# Patient Record
Sex: MALE | Race: OTHER | HISPANIC OR LATINO
[De-identification: names, ages, dates, MRNs, and addresses within clinical notes are randomized per-mention and may not be internally consistent; named-entity substitution may affect disease eponyms.]

---

## 2018-06-13 ENCOUNTER — TRANSCRIPTION ENCOUNTER (OUTPATIENT)
Age: 73
End: 2018-06-13

## 2018-06-14 ENCOUNTER — OUTPATIENT (OUTPATIENT)
Dept: OUTPATIENT SERVICES | Facility: HOSPITAL | Age: 73
LOS: 1 days | End: 2018-06-14
Payer: COMMERCIAL

## 2018-06-14 DIAGNOSIS — Z86.010 PERSONAL HISTORY OF COLONIC POLYPS: ICD-10-CM

## 2018-06-14 PROCEDURE — 45378 DIAGNOSTIC COLONOSCOPY: CPT

## 2018-11-19 PROBLEM — Z86.79 HISTORY OF HYPERTENSION: Status: RESOLVED | Noted: 2018-11-19 | Resolved: 2018-11-19

## 2018-11-19 PROBLEM — Z81.8 FAMILY HISTORY OF DEPRESSION: Status: ACTIVE | Noted: 2018-11-19

## 2018-11-19 PROBLEM — Z80.0 FAMILY HISTORY OF MALIGNANT NEOPLASM OF COLON: Status: ACTIVE | Noted: 2018-11-19

## 2018-11-19 PROBLEM — Z78.9 SOCIAL ALCOHOL USE: Status: ACTIVE | Noted: 2018-11-19

## 2018-11-19 PROBLEM — K64.8 HEMORRHOIDS, INTERNAL: Status: RESOLVED | Noted: 2018-11-19 | Resolved: 2018-11-19

## 2018-11-19 PROBLEM — Z00.00 ENCOUNTER FOR PREVENTIVE HEALTH EXAMINATION: Status: ACTIVE | Noted: 2018-11-19

## 2018-11-19 PROBLEM — I35.0 MILD AORTIC STENOSIS: Status: RESOLVED | Noted: 2018-11-19 | Resolved: 2018-11-19

## 2018-11-19 PROBLEM — Z87.891 FORMER SMOKER: Status: ACTIVE | Noted: 2018-11-19

## 2018-11-20 ENCOUNTER — APPOINTMENT (OUTPATIENT)
Dept: CARDIOTHORACIC SURGERY | Facility: CLINIC | Age: 73
End: 2018-11-20
Payer: MEDICARE

## 2018-11-20 VITALS
HEIGHT: 68 IN | BODY MASS INDEX: 35.46 KG/M2 | WEIGHT: 234 LBS | SYSTOLIC BLOOD PRESSURE: 167 MMHG | DIASTOLIC BLOOD PRESSURE: 107 MMHG | OXYGEN SATURATION: 98 % | HEART RATE: 65 BPM | RESPIRATION RATE: 14 BRPM | TEMPERATURE: 98.3 F

## 2018-11-20 VITALS — DIASTOLIC BLOOD PRESSURE: 85 MMHG | SYSTOLIC BLOOD PRESSURE: 141 MMHG

## 2018-11-20 DIAGNOSIS — Z87.891 PERSONAL HISTORY OF NICOTINE DEPENDENCE: ICD-10-CM

## 2018-11-20 DIAGNOSIS — Z80.0 FAMILY HISTORY OF MALIGNANT NEOPLASM OF DIGESTIVE ORGANS: ICD-10-CM

## 2018-11-20 DIAGNOSIS — I35.0 NONRHEUMATIC AORTIC (VALVE) STENOSIS: ICD-10-CM

## 2018-11-20 DIAGNOSIS — Z86.79 PERSONAL HISTORY OF OTHER DISEASES OF THE CIRCULATORY SYSTEM: ICD-10-CM

## 2018-11-20 DIAGNOSIS — I71.2 THORACIC AORTIC ANEURYSM, W/OUT RUPTURE: ICD-10-CM

## 2018-11-20 DIAGNOSIS — K64.8 OTHER HEMORRHOIDS: ICD-10-CM

## 2018-11-20 DIAGNOSIS — Z81.8 FAMILY HISTORY OF OTHER MENTAL AND BEHAVIORAL DISORDERS: ICD-10-CM

## 2018-11-20 DIAGNOSIS — Z78.9 OTHER SPECIFIED HEALTH STATUS: ICD-10-CM

## 2018-11-20 PROCEDURE — 99204 OFFICE O/P NEW MOD 45 MIN: CPT

## 2018-11-20 RX ORDER — COLD-HOT PACK
125 MCG EACH MISCELLANEOUS DAILY
Refills: 0 | Status: ACTIVE | COMMUNITY

## 2018-11-20 RX ORDER — ASPIRIN 325 MG/1
325 TABLET, COATED ORAL
Refills: 6 | Status: COMPLETED | COMMUNITY
End: 2018-11-20

## 2018-11-20 RX ORDER — HYDROCORTISONE ACETATE 0.5 %
CREAM (GRAM) TOPICAL DAILY
Refills: 0 | Status: ACTIVE | COMMUNITY
Start: 2018-11-20

## 2018-12-03 ENCOUNTER — OUTPATIENT (OUTPATIENT)
Dept: OUTPATIENT SERVICES | Facility: HOSPITAL | Age: 73
LOS: 1 days | End: 2018-12-03
Payer: COMMERCIAL

## 2018-12-03 VITALS
RESPIRATION RATE: 16 BRPM | DIASTOLIC BLOOD PRESSURE: 91 MMHG | HEIGHT: 68 IN | OXYGEN SATURATION: 96 % | HEART RATE: 68 BPM | SYSTOLIC BLOOD PRESSURE: 180 MMHG | TEMPERATURE: 98 F | WEIGHT: 233.91 LBS

## 2018-12-03 DIAGNOSIS — I71.8 AORTIC ANEURYSM OF UNSPECIFIED SITE, RUPTURED: ICD-10-CM

## 2018-12-03 DIAGNOSIS — Z98.49 CATARACT EXTRACTION STATUS, UNSPECIFIED EYE: Chronic | ICD-10-CM

## 2018-12-03 DIAGNOSIS — Z98.890 OTHER SPECIFIED POSTPROCEDURAL STATES: Chronic | ICD-10-CM

## 2018-12-03 LAB
ALBUMIN SERPL ELPH-MCNC: 4.3 G/DL — SIGNIFICANT CHANGE UP (ref 3.3–5)
ALP SERPL-CCNC: 66 U/L — SIGNIFICANT CHANGE UP (ref 40–120)
ALT FLD-CCNC: 15 U/L — SIGNIFICANT CHANGE UP (ref 10–45)
ANION GAP SERPL CALC-SCNC: 13 MMOL/L — SIGNIFICANT CHANGE UP (ref 5–17)
AST SERPL-CCNC: 23 U/L — SIGNIFICANT CHANGE UP (ref 10–40)
BILIRUB SERPL-MCNC: 0.4 MG/DL — SIGNIFICANT CHANGE UP (ref 0.2–1.2)
BUN SERPL-MCNC: 21 MG/DL — SIGNIFICANT CHANGE UP (ref 7–23)
CALCIUM SERPL-MCNC: 9.2 MG/DL — SIGNIFICANT CHANGE UP (ref 8.4–10.5)
CHLORIDE SERPL-SCNC: 106 MMOL/L — SIGNIFICANT CHANGE UP (ref 96–108)
CO2 SERPL-SCNC: 22 MMOL/L — SIGNIFICANT CHANGE UP (ref 22–31)
CREAT SERPL-MCNC: 0.88 MG/DL — SIGNIFICANT CHANGE UP (ref 0.5–1.3)
GLUCOSE SERPL-MCNC: 104 MG/DL — HIGH (ref 70–99)
HCT VFR BLD CALC: 40.9 % — SIGNIFICANT CHANGE UP (ref 39–50)
HGB BLD-MCNC: 13.6 G/DL — SIGNIFICANT CHANGE UP (ref 13–17)
MCHC RBC-ENTMCNC: 30.8 PG — SIGNIFICANT CHANGE UP (ref 27–34)
MCHC RBC-ENTMCNC: 33.4 GM/DL — SIGNIFICANT CHANGE UP (ref 32–36)
MCV RBC AUTO: 92.3 FL — SIGNIFICANT CHANGE UP (ref 80–100)
PLATELET # BLD AUTO: 196 K/UL — SIGNIFICANT CHANGE UP (ref 150–400)
POTASSIUM SERPL-MCNC: 4.8 MMOL/L — SIGNIFICANT CHANGE UP (ref 3.5–5.3)
POTASSIUM SERPL-SCNC: 4.8 MMOL/L — SIGNIFICANT CHANGE UP (ref 3.5–5.3)
PROT SERPL-MCNC: 7.2 G/DL — SIGNIFICANT CHANGE UP (ref 6–8.3)
RBC # BLD: 4.43 M/UL — SIGNIFICANT CHANGE UP (ref 4.2–5.8)
RBC # FLD: 12 % — SIGNIFICANT CHANGE UP (ref 10.3–14.5)
SODIUM SERPL-SCNC: 141 MMOL/L — SIGNIFICANT CHANGE UP (ref 135–145)
WBC # BLD: 5.6 K/UL — SIGNIFICANT CHANGE UP (ref 3.8–10.5)
WBC # FLD AUTO: 5.6 K/UL — SIGNIFICANT CHANGE UP (ref 3.8–10.5)

## 2018-12-03 PROCEDURE — 80053 COMPREHEN METABOLIC PANEL: CPT

## 2018-12-03 PROCEDURE — 99152 MOD SED SAME PHYS/QHP 5/>YRS: CPT

## 2018-12-03 PROCEDURE — 85027 COMPLETE CBC AUTOMATED: CPT

## 2018-12-03 PROCEDURE — C1769: CPT

## 2018-12-03 PROCEDURE — C1894: CPT

## 2018-12-03 PROCEDURE — C1887: CPT

## 2018-12-03 PROCEDURE — 93005 ELECTROCARDIOGRAM TRACING: CPT

## 2018-12-03 PROCEDURE — 99203 OFFICE O/P NEW LOW 30 MIN: CPT

## 2018-12-03 PROCEDURE — 93010 ELECTROCARDIOGRAM REPORT: CPT

## 2018-12-03 PROCEDURE — 93454 CORONARY ARTERY ANGIO S&I: CPT | Mod: 26,GC

## 2018-12-03 PROCEDURE — 93454 CORONARY ARTERY ANGIO S&I: CPT

## 2018-12-03 NOTE — H&P CARDIOLOGY - HISTORY OF PRESENT ILLNESS
73 year old  male h/o HTN, ascending aortic aneurysm (5.3 cm) who c/o PISANO x 1 year. Pt is here today for [facility] prior to aneurysm repair surgery on Dec 13th.

## 2018-12-05 ENCOUNTER — OUTPATIENT (OUTPATIENT)
Dept: OUTPATIENT SERVICES | Facility: HOSPITAL | Age: 73
LOS: 1 days | End: 2018-12-05
Payer: COMMERCIAL

## 2018-12-05 ENCOUNTER — APPOINTMENT (OUTPATIENT)
Dept: ULTRASOUND IMAGING | Facility: HOSPITAL | Age: 73
End: 2018-12-05

## 2018-12-05 ENCOUNTER — APPOINTMENT (OUTPATIENT)
Dept: CARDIOTHORACIC SURGERY | Facility: CLINIC | Age: 73
End: 2018-12-05
Payer: MEDICARE

## 2018-12-05 VITALS
HEIGHT: 68 IN | OXYGEN SATURATION: 96 % | HEART RATE: 84 BPM | RESPIRATION RATE: 16 BRPM | DIASTOLIC BLOOD PRESSURE: 80 MMHG | WEIGHT: 233.91 LBS | SYSTOLIC BLOOD PRESSURE: 160 MMHG | TEMPERATURE: 99 F

## 2018-12-05 DIAGNOSIS — I71.1 THORACIC AORTIC ANEURYSM, RUPTURED: ICD-10-CM

## 2018-12-05 DIAGNOSIS — Z29.9 ENCOUNTER FOR PROPHYLACTIC MEASURES, UNSPECIFIED: ICD-10-CM

## 2018-12-05 DIAGNOSIS — Z98.49 CATARACT EXTRACTION STATUS, UNSPECIFIED EYE: Chronic | ICD-10-CM

## 2018-12-05 DIAGNOSIS — G47.33 OBSTRUCTIVE SLEEP APNEA (ADULT) (PEDIATRIC): ICD-10-CM

## 2018-12-05 DIAGNOSIS — I35.0 NONRHEUMATIC AORTIC (VALVE) STENOSIS: ICD-10-CM

## 2018-12-05 DIAGNOSIS — Z01.818 ENCOUNTER FOR OTHER PREPROCEDURAL EXAMINATION: ICD-10-CM

## 2018-12-05 DIAGNOSIS — Z98.890 OTHER SPECIFIED POSTPROCEDURAL STATES: Chronic | ICD-10-CM

## 2018-12-05 DIAGNOSIS — I10 ESSENTIAL (PRIMARY) HYPERTENSION: ICD-10-CM

## 2018-12-05 DIAGNOSIS — I71.2 THORACIC AORTIC ANEURYSM, WITHOUT RUPTURE: ICD-10-CM

## 2018-12-05 LAB
ANION GAP SERPL CALC-SCNC: 11 MMOL/L — SIGNIFICANT CHANGE UP (ref 5–17)
BLD GP AB SCN SERPL QL: NEGATIVE — SIGNIFICANT CHANGE UP
BUN SERPL-MCNC: 13 MG/DL — SIGNIFICANT CHANGE UP (ref 7–23)
CALCIUM SERPL-MCNC: 9.2 MG/DL — SIGNIFICANT CHANGE UP (ref 8.4–10.5)
CHLORIDE SERPL-SCNC: 107 MMOL/L — SIGNIFICANT CHANGE UP (ref 96–108)
CO2 SERPL-SCNC: 25 MMOL/L — SIGNIFICANT CHANGE UP (ref 22–31)
CREAT SERPL-MCNC: 0.91 MG/DL — SIGNIFICANT CHANGE UP (ref 0.5–1.3)
GLUCOSE SERPL-MCNC: 125 MG/DL — HIGH (ref 70–99)
HBA1C BLD-MCNC: 5.5 % — SIGNIFICANT CHANGE UP (ref 4–5.6)
HCT VFR BLD CALC: 39.1 % — SIGNIFICANT CHANGE UP (ref 39–50)
HGB BLD-MCNC: 13.2 G/DL — SIGNIFICANT CHANGE UP (ref 13–17)
MCHC RBC-ENTMCNC: 31.2 PG — SIGNIFICANT CHANGE UP (ref 27–34)
MCHC RBC-ENTMCNC: 33.8 GM/DL — SIGNIFICANT CHANGE UP (ref 32–36)
MCV RBC AUTO: 92.4 FL — SIGNIFICANT CHANGE UP (ref 80–100)
MRSA PCR RESULT.: SIGNIFICANT CHANGE UP
PLATELET # BLD AUTO: 204 K/UL — SIGNIFICANT CHANGE UP (ref 150–400)
POTASSIUM SERPL-MCNC: 4 MMOL/L — SIGNIFICANT CHANGE UP (ref 3.5–5.3)
POTASSIUM SERPL-SCNC: 4 MMOL/L — SIGNIFICANT CHANGE UP (ref 3.5–5.3)
RBC # BLD: 4.23 M/UL — SIGNIFICANT CHANGE UP (ref 4.2–5.8)
RBC # FLD: 13.3 % — SIGNIFICANT CHANGE UP (ref 10.3–14.5)
RH IG SCN BLD-IMP: POSITIVE — SIGNIFICANT CHANGE UP
S AUREUS DNA NOSE QL NAA+PROBE: SIGNIFICANT CHANGE UP
SODIUM SERPL-SCNC: 143 MMOL/L — SIGNIFICANT CHANGE UP (ref 135–145)
WBC # BLD: 5.4 K/UL — SIGNIFICANT CHANGE UP (ref 3.8–10.5)
WBC # FLD AUTO: 5.4 K/UL — SIGNIFICANT CHANGE UP (ref 3.8–10.5)

## 2018-12-05 PROCEDURE — 93880 EXTRACRANIAL BILAT STUDY: CPT | Mod: 26

## 2018-12-05 PROCEDURE — 85027 COMPLETE CBC AUTOMATED: CPT

## 2018-12-05 PROCEDURE — 83036 HEMOGLOBIN GLYCOSYLATED A1C: CPT

## 2018-12-05 PROCEDURE — 71046 X-RAY EXAM CHEST 2 VIEWS: CPT | Mod: 26

## 2018-12-05 PROCEDURE — 86901 BLOOD TYPING SEROLOGIC RH(D): CPT

## 2018-12-05 PROCEDURE — 86850 RBC ANTIBODY SCREEN: CPT

## 2018-12-05 PROCEDURE — 86900 BLOOD TYPING SEROLOGIC ABO: CPT

## 2018-12-05 PROCEDURE — G0463: CPT

## 2018-12-05 PROCEDURE — 71046 X-RAY EXAM CHEST 2 VIEWS: CPT

## 2018-12-05 PROCEDURE — 94010 BREATHING CAPACITY TEST: CPT

## 2018-12-05 PROCEDURE — 87641 MR-STAPH DNA AMP PROBE: CPT

## 2018-12-05 PROCEDURE — 80048 BASIC METABOLIC PNL TOTAL CA: CPT

## 2018-12-05 PROCEDURE — 87640 STAPH A DNA AMP PROBE: CPT

## 2018-12-05 PROCEDURE — 93880 EXTRACRANIAL BILAT STUDY: CPT

## 2018-12-05 RX ORDER — SODIUM CHLORIDE 9 MG/ML
3 INJECTION INTRAMUSCULAR; INTRAVENOUS; SUBCUTANEOUS EVERY 8 HOURS
Qty: 0 | Refills: 0 | Status: DISCONTINUED | OUTPATIENT
Start: 2018-12-13 | End: 2018-12-13

## 2018-12-05 RX ORDER — LIDOCAINE HCL 20 MG/ML
0.2 VIAL (ML) INJECTION ONCE
Qty: 0 | Refills: 0 | Status: DISCONTINUED | OUTPATIENT
Start: 2018-12-13 | End: 2018-12-13

## 2018-12-05 RX ORDER — CEFUROXIME AXETIL 250 MG
1500 TABLET ORAL ONCE
Qty: 0 | Refills: 0 | Status: COMPLETED | OUTPATIENT
Start: 2018-12-13 | End: 2018-12-13

## 2018-12-05 NOTE — H&P PST ADULT - PROBLEM SELECTOR PLAN 4
no The Caprini score indicates that this patient is at high risk for a VTE event (score = 6).    Ssurgical patients in this group will benefit from both pharmacologic prophylaxis and intermittent compression devices.    The surgical team will determine the balance between VTE risk and bleeding risk, and other clinical considerations.

## 2018-12-05 NOTE — H&P PST ADULT - NEGATIVE CARDIOVASCULAR SYMPTOMS
no orthopnea/no paroxysmal nocturnal dyspnea/no claudication/no chest pain/no peripheral edema/no palpitations

## 2018-12-05 NOTE — H&P PST ADULT - VISION (WITH CORRECTIVE LENSES IF THE PATIENT USUALLY WEARS THEM):
Do Not Resuscitate (DNR)/Health Care Proxy (HCP) Normal vision: sees adequately in most situations; can see medication labels, newsprint

## 2018-12-05 NOTE — H&P PST ADULT - HISTORY OF PRESENT ILLNESS
74 yo  73 year old male with a past medical history of hypertension , Hemorrhoids presents for an initial evaluation and management of an ascending aortic aneurysm. He complaints of shortness of breath with exertion for 1 year. Denies any chest pain, palpitations, dizziness, hemoptysis, pedal edema, PND .     Echocardiogram on 11/15/2018 revealed   -trace mitral regurgitation   -ascending and transverse aorta are dilated measuring up to 5.25 cm.    CTA chest on 11/16/2018 revealed   -ascending thoracic aorta measures 5.3 cm  -aortic arch: 2.8 cm  -descending aorta measures 3.1 cm.   -No evidence of dissection 74 yo male, PMH HTN, with known AAA for about 2 years, reports PISANO that he first noticed last winter when shoveling snow and has become worse, recent echocardiogram on 11/15/18 revealed an increase in size on AAA to 5.25 and calcified aortic valve. Pt. presents to PST today for scheduled Aortic Valve Replacement on 12/13/18. Pt. denies recent fever, chills, chest pain, palpitations, changes in bowel/urinary habits. 74 yo male, PMH HTN, with known AAA for about 2 years, reports PISANO that he first noticed last winter when shoveling snow and has become worse, recent echocardiogram on 11/15/18 revealed an increase in size on AAA to 5.25 and calcified aortic valve. Pt. presents to PST today for scheduled Hemiarch aortic Valve Replacement/right Radial A-line on 12/13/18. Pt. denies recent fever, chills, chest pain, palpitations, changes in bowel/urinary habits.

## 2018-12-05 NOTE — H&P PST ADULT - ASSESSMENT
CAPRINI SCORE [CLOT]    AGE RELATED RISK FACTORS                                                       MOBILITY RELATED FACTORS  [ ] Age 41-60 years                                            (1 Point)                  [ ] Bed rest                                                        (1 Point)  [ ] Age: 61-74 years                                           (2 Points)                 [ ] Plaster cast                                                   (2 Points)  [x ] Age= 75 years                                              (3 Points)                 [ ] Bed bound for more than 72 hours                 (2 Points)    DISEASE RELATED RISK FACTORS                                               GENDER SPECIFIC FACTORS  [ ] Edema in the lower extremities                       (1 Point)                  [ ] Pregnancy                                                     (1 Point)  [ ] Varicose veins                                               (1 Point)                  [ ] Post-partum < 6 weeks                                   (1 Point)             [x ] BMI > 25 Kg/m2                                            (1 Point)                  [ ] Hormonal therapy  or oral contraception          (1 Point)                 [ ] Sepsis (in the previous month)                        (1 Point)                  [ ] History of pregnancy complications                 (1 point)  [ ] Pneumonia or serious lung disease                                               [ ] Unexplained or recurrent                     (1 Point)           (in the previous month)                               (1 Point)  [ ] Abnormal pulmonary function test                     (1 Point)                 SURGERY RELATED RISK FACTORS  [ ] Acute myocardial infarction                              (1 Point)                 [ ]  Section                                             (1 Point)  [ ] Congestive heart failure (in the previous month)  (1 Point)               [ ] Minor surgery                                                  (1 Point)   [ ] Inflammatory bowel disease                             (1 Point)                 [ ] Arthroscopic surgery                                        (2 Points)  [ ] Central venous access                                      (2 Points)                [x ] General surgery lasting more than 45 minutes   (2 Points)       [ ] Stroke (in the previous month)                          (5 Points)               [ ] Elective arthroplasty                                         (5 Points)                                                                                                                                               HEMATOLOGY RELATED FACTORS                                                 TRAUMA RELATED RISK FACTORS  [ ] Prior episodes of VTE                                     (3 Points)                 [ ] Fracture of the hip, pelvis, or leg                       (5 Points)  [ ] Positive family history for VTE                         (3 Points)                 [ ] Acute spinal cord injury (in the previous month)  (5 Points)  [ ] Prothrombin 01091 A                                     (3 Points)                 [ ] Paralysis  (less than 1 month)                             (5 Points)  [ ] Factor V Leiden                                             (3 Points)                  [ ] Multiple Trauma within 1 month                        (5 Points)  [ ] Lupus anticoagulants                                     (3 Points)                                                           [ ] Anticardiolipin antibodies                               (3 Points)                                                       [ ] High homocysteine in the blood                      (3 Points)                                             [ ] Other congenital or acquired thrombophilia      (3 Points)                                                [ ] Heparin induced thrombocytopenia                  (3 Points)                                          Total Score [   6       ]

## 2018-12-05 NOTE — H&P PST ADULT - NSANTHOSAYNRD_GEN_A_CORE
No. KRISTINE screening performed.  STOP BANG Legend: 0-2 = LOW Risk; 3-4 = INTERMEDIATE Risk; 5-8 = HIGH Risk

## 2018-12-05 NOTE — H&P PST ADULT - NEGATIVE ENMT SYMPTOMS
no nasal congestion/no nasal discharge/no vertigo/no tinnitus no nasal congestion/no nasal discharge/no vertigo/no sinus symptoms/no ear pain/no tinnitus

## 2018-12-05 NOTE — H&P PST ADULT - PMH
Aortic valve disease    Ascending aortic aneurysm    HTN (hypertension)    OA (osteoarthritis) of knee  left

## 2018-12-05 NOTE — H&P PST ADULT - PROBLEM SELECTOR PLAN 1
aortic Valve Hemiarch aortic Valve Replacement/right Radial A-line   Pre-op instructions, including Chlorhexidine soap, provided - all questions answered

## 2018-12-06 PROBLEM — R58 HEMORRHAGE, NOT ELSEWHERE CLASSIFIED: Chronic | Status: INACTIVE | Noted: 2018-12-03 | Resolved: 2018-12-05

## 2018-12-12 ENCOUNTER — TRANSCRIPTION ENCOUNTER (OUTPATIENT)
Age: 73
End: 2018-12-12

## 2018-12-12 PROBLEM — I71.2 THORACIC AORTIC ANEURYSM, WITHOUT RUPTURE: Chronic | Status: ACTIVE | Noted: 2018-12-03

## 2018-12-12 PROBLEM — I10 ESSENTIAL (PRIMARY) HYPERTENSION: Chronic | Status: ACTIVE | Noted: 2018-12-03

## 2018-12-12 PROBLEM — K64.9 UNSPECIFIED HEMORRHOIDS: Chronic | Status: ACTIVE | Noted: 2018-12-05

## 2018-12-12 PROBLEM — I35.9 NONRHEUMATIC AORTIC VALVE DISORDER, UNSPECIFIED: Chronic | Status: ACTIVE | Noted: 2018-12-05

## 2018-12-12 PROBLEM — M17.10 UNILATERAL PRIMARY OSTEOARTHRITIS, UNSPECIFIED KNEE: Chronic | Status: ACTIVE | Noted: 2018-12-05

## 2018-12-13 ENCOUNTER — INPATIENT (INPATIENT)
Facility: HOSPITAL | Age: 73
LOS: 6 days | Discharge: HOME | DRG: 219 | End: 2018-12-20
Attending: THORACIC SURGERY (CARDIOTHORACIC VASCULAR SURGERY) | Admitting: THORACIC SURGERY (CARDIOTHORACIC VASCULAR SURGERY)
Payer: COMMERCIAL

## 2018-12-13 ENCOUNTER — APPOINTMENT (OUTPATIENT)
Dept: CARDIOTHORACIC SURGERY | Facility: HOSPITAL | Age: 73
End: 2018-12-13
Payer: MEDICARE

## 2018-12-13 ENCOUNTER — RESULT REVIEW (OUTPATIENT)
Age: 73
End: 2018-12-13

## 2018-12-13 VITALS
RESPIRATION RATE: 18 BRPM | HEIGHT: 68 IN | DIASTOLIC BLOOD PRESSURE: 84 MMHG | WEIGHT: 237.66 LBS | TEMPERATURE: 98 F | HEART RATE: 70 BPM | OXYGEN SATURATION: 98 % | SYSTOLIC BLOOD PRESSURE: 146 MMHG

## 2018-12-13 DIAGNOSIS — I71.1 THORACIC AORTIC ANEURYSM, RUPTURED: ICD-10-CM

## 2018-12-13 DIAGNOSIS — Z98.49 CATARACT EXTRACTION STATUS, UNSPECIFIED EYE: Chronic | ICD-10-CM

## 2018-12-13 DIAGNOSIS — I35.0 NONRHEUMATIC AORTIC (VALVE) STENOSIS: ICD-10-CM

## 2018-12-13 DIAGNOSIS — Z98.890 OTHER SPECIFIED POSTPROCEDURAL STATES: Chronic | ICD-10-CM

## 2018-12-13 LAB
ALBUMIN SERPL ELPH-MCNC: 3.4 G/DL — SIGNIFICANT CHANGE UP (ref 3.3–5)
ALP SERPL-CCNC: 38 U/L — LOW (ref 40–120)
ALT FLD-CCNC: 13 U/L — SIGNIFICANT CHANGE UP (ref 10–45)
ANION GAP SERPL CALC-SCNC: 13 MMOL/L — SIGNIFICANT CHANGE UP (ref 5–17)
APTT BLD: 31.1 SEC — SIGNIFICANT CHANGE UP (ref 27.5–36.3)
AST SERPL-CCNC: 26 U/L — SIGNIFICANT CHANGE UP (ref 10–40)
BASOPHILS # BLD AUTO: 0.1 K/UL — SIGNIFICANT CHANGE UP (ref 0–0.2)
BASOPHILS NFR BLD AUTO: 0.9 % — SIGNIFICANT CHANGE UP (ref 0–2)
BILIRUB SERPL-MCNC: 0.8 MG/DL — SIGNIFICANT CHANGE UP (ref 0.2–1.2)
BUN SERPL-MCNC: 16 MG/DL — SIGNIFICANT CHANGE UP (ref 7–23)
CALCIUM SERPL-MCNC: 7.9 MG/DL — LOW (ref 8.4–10.5)
CHLORIDE SERPL-SCNC: 107 MMOL/L — SIGNIFICANT CHANGE UP (ref 96–108)
CK MB CFR SERPL CALC: 10.6 NG/ML — HIGH (ref 0–6.7)
CK SERPL-CCNC: 339 U/L — HIGH (ref 30–200)
CO2 SERPL-SCNC: 22 MMOL/L — SIGNIFICANT CHANGE UP (ref 22–31)
CREAT SERPL-MCNC: 0.76 MG/DL — SIGNIFICANT CHANGE UP (ref 0.5–1.3)
EOSINOPHIL # BLD AUTO: 0.1 K/UL — SIGNIFICANT CHANGE UP (ref 0–0.5)
EOSINOPHIL NFR BLD AUTO: 1.1 % — SIGNIFICANT CHANGE UP (ref 0–6)
FIBRINOGEN PPP-MCNC: 359 MG/DL — SIGNIFICANT CHANGE UP (ref 350–510)
GAS PNL BLDA: SIGNIFICANT CHANGE UP
GLUCOSE SERPL-MCNC: 119 MG/DL — HIGH (ref 70–99)
HCT VFR BLD CALC: 29.7 % — LOW (ref 39–50)
HGB BLD-MCNC: 10.6 G/DL — LOW (ref 13–17)
INR BLD: 1.28 RATIO — HIGH (ref 0.88–1.16)
LYMPHOCYTES # BLD AUTO: 0.9 K/UL — LOW (ref 1–3.3)
LYMPHOCYTES # BLD AUTO: 8.9 % — LOW (ref 13–44)
MCHC RBC-ENTMCNC: 33 PG — SIGNIFICANT CHANGE UP (ref 27–34)
MCHC RBC-ENTMCNC: 35.6 GM/DL — SIGNIFICANT CHANGE UP (ref 32–36)
MCV RBC AUTO: 92.5 FL — SIGNIFICANT CHANGE UP (ref 80–100)
MONOCYTES # BLD AUTO: 0.8 K/UL — SIGNIFICANT CHANGE UP (ref 0–0.9)
MONOCYTES NFR BLD AUTO: 8.4 % — SIGNIFICANT CHANGE UP (ref 2–14)
NEUTROPHILS # BLD AUTO: 7.9 K/UL — HIGH (ref 1.8–7.4)
NEUTROPHILS NFR BLD AUTO: 80.6 % — HIGH (ref 43–77)
PLATELET # BLD AUTO: 71 K/UL — LOW (ref 150–400)
POTASSIUM SERPL-MCNC: 4.2 MMOL/L — SIGNIFICANT CHANGE UP (ref 3.5–5.3)
POTASSIUM SERPL-SCNC: 4.2 MMOL/L — SIGNIFICANT CHANGE UP (ref 3.5–5.3)
PROT SERPL-MCNC: 4.8 G/DL — LOW (ref 6–8.3)
PROTHROM AB SERPL-ACNC: 14.7 SEC — HIGH (ref 10–12.9)
RBC # BLD: 3.21 M/UL — LOW (ref 4.2–5.8)
RBC # FLD: 12 % — SIGNIFICANT CHANGE UP (ref 10.3–14.5)
RH IG SCN BLD-IMP: POSITIVE — SIGNIFICANT CHANGE UP
SODIUM SERPL-SCNC: 142 MMOL/L — SIGNIFICANT CHANGE UP (ref 135–145)
TROPONIN T, HIGH SENSITIVITY RESULT: 286 NG/L — HIGH (ref 0–51)
WBC # BLD: 9.8 K/UL — SIGNIFICANT CHANGE UP (ref 3.8–10.5)
WBC # FLD AUTO: 9.8 K/UL — SIGNIFICANT CHANGE UP (ref 3.8–10.5)

## 2018-12-13 PROCEDURE — 93010 ELECTROCARDIOGRAM REPORT: CPT

## 2018-12-13 PROCEDURE — 99291 CRITICAL CARE FIRST HOUR: CPT

## 2018-12-13 PROCEDURE — 33870: CPT

## 2018-12-13 PROCEDURE — 33860: CPT | Mod: 59

## 2018-12-13 PROCEDURE — 33405 REPLACEMENT AORTIC VALVE OPN: CPT

## 2018-12-13 PROCEDURE — 71045 X-RAY EXAM CHEST 1 VIEW: CPT | Mod: 26

## 2018-12-13 PROCEDURE — 88305 TISSUE EXAM BY PATHOLOGIST: CPT | Mod: 26

## 2018-12-13 PROCEDURE — 88304 TISSUE EXAM BY PATHOLOGIST: CPT | Mod: 26

## 2018-12-13 PROCEDURE — 88311 DECALCIFY TISSUE: CPT | Mod: 26

## 2018-12-13 RX ORDER — POTASSIUM CHLORIDE 20 MEQ
10 PACKET (EA) ORAL
Qty: 0 | Refills: 0 | Status: DISCONTINUED | OUTPATIENT
Start: 2018-12-13 | End: 2018-12-20

## 2018-12-13 RX ORDER — NICARDIPINE HYDROCHLORIDE 30 MG/1
5 CAPSULE, EXTENDED RELEASE ORAL
Qty: 40 | Refills: 0 | Status: DISCONTINUED | OUTPATIENT
Start: 2018-12-13 | End: 2018-12-15

## 2018-12-13 RX ORDER — CHLORHEXIDINE GLUCONATE 213 G/1000ML
5 SOLUTION TOPICAL EVERY 4 HOURS
Qty: 0 | Refills: 0 | Status: DISCONTINUED | OUTPATIENT
Start: 2018-12-13 | End: 2018-12-20

## 2018-12-13 RX ORDER — DEXTROSE 50 % IN WATER 50 %
25 SYRINGE (ML) INTRAVENOUS
Qty: 0 | Refills: 0 | Status: DISCONTINUED | OUTPATIENT
Start: 2018-12-13 | End: 2018-12-14

## 2018-12-13 RX ORDER — NOREPINEPHRINE BITARTRATE/D5W 8 MG/250ML
0.05 PLASTIC BAG, INJECTION (ML) INTRAVENOUS
Qty: 8 | Refills: 0 | Status: DISCONTINUED | OUTPATIENT
Start: 2018-12-13 | End: 2018-12-15

## 2018-12-13 RX ORDER — HYDROMORPHONE HYDROCHLORIDE 2 MG/ML
0.5 INJECTION INTRAMUSCULAR; INTRAVENOUS; SUBCUTANEOUS ONCE
Qty: 0 | Refills: 0 | Status: DISCONTINUED | OUTPATIENT
Start: 2018-12-13 | End: 2018-12-13

## 2018-12-13 RX ORDER — DEXMEDETOMIDINE HYDROCHLORIDE IN 0.9% SODIUM CHLORIDE 4 UG/ML
0.07 INJECTION INTRAVENOUS
Qty: 200 | Refills: 0 | Status: DISCONTINUED | OUTPATIENT
Start: 2018-12-13 | End: 2018-12-13

## 2018-12-13 RX ORDER — FAMOTIDINE 10 MG/ML
20 INJECTION INTRAVENOUS DAILY
Qty: 0 | Refills: 0 | Status: DISCONTINUED | OUTPATIENT
Start: 2018-12-13 | End: 2018-12-14

## 2018-12-13 RX ORDER — ALBUMIN HUMAN 25 %
250 VIAL (ML) INTRAVENOUS
Qty: 0 | Refills: 0 | Status: DISCONTINUED | OUTPATIENT
Start: 2018-12-13 | End: 2018-12-13

## 2018-12-13 RX ORDER — INSULIN HUMAN 100 [IU]/ML
2 INJECTION, SOLUTION SUBCUTANEOUS
Qty: 100 | Refills: 0 | Status: DISCONTINUED | OUTPATIENT
Start: 2018-12-13 | End: 2018-12-14

## 2018-12-13 RX ORDER — POTASSIUM CHLORIDE 20 MEQ
10 PACKET (EA) ORAL
Qty: 0 | Refills: 0 | Status: COMPLETED | OUTPATIENT
Start: 2018-12-13 | End: 2018-12-13

## 2018-12-13 RX ORDER — MEPERIDINE HYDROCHLORIDE 50 MG/ML
25 INJECTION INTRAMUSCULAR; INTRAVENOUS; SUBCUTANEOUS ONCE
Qty: 0 | Refills: 0 | Status: DISCONTINUED | OUTPATIENT
Start: 2018-12-13 | End: 2018-12-13

## 2018-12-13 RX ORDER — ALBUMIN HUMAN 25 %
250 VIAL (ML) INTRAVENOUS ONCE
Qty: 0 | Refills: 0 | Status: COMPLETED | OUTPATIENT
Start: 2018-12-13 | End: 2018-12-13

## 2018-12-13 RX ORDER — OXYCODONE AND ACETAMINOPHEN 5; 325 MG/1; MG/1
1 TABLET ORAL EVERY 4 HOURS
Qty: 0 | Refills: 0 | Status: DISCONTINUED | OUTPATIENT
Start: 2018-12-13 | End: 2018-12-17

## 2018-12-13 RX ORDER — DOCUSATE SODIUM 100 MG
100 CAPSULE ORAL THREE TIMES A DAY
Qty: 0 | Refills: 0 | Status: DISCONTINUED | OUTPATIENT
Start: 2018-12-13 | End: 2018-12-20

## 2018-12-13 RX ORDER — CEFUROXIME AXETIL 250 MG
1500 TABLET ORAL EVERY 8 HOURS
Qty: 0 | Refills: 0 | Status: COMPLETED | OUTPATIENT
Start: 2018-12-13 | End: 2018-12-15

## 2018-12-13 RX ORDER — DEXTROSE 50 % IN WATER 50 %
50 SYRINGE (ML) INTRAVENOUS
Qty: 0 | Refills: 0 | Status: DISCONTINUED | OUTPATIENT
Start: 2018-12-13 | End: 2018-12-14

## 2018-12-13 RX ORDER — SODIUM CHLORIDE 9 MG/ML
1000 INJECTION INTRAMUSCULAR; INTRAVENOUS; SUBCUTANEOUS
Qty: 0 | Refills: 0 | Status: DISCONTINUED | OUTPATIENT
Start: 2018-12-13 | End: 2018-12-20

## 2018-12-13 RX ORDER — OXYCODONE AND ACETAMINOPHEN 5; 325 MG/1; MG/1
2 TABLET ORAL EVERY 6 HOURS
Qty: 0 | Refills: 0 | Status: DISCONTINUED | OUTPATIENT
Start: 2018-12-13 | End: 2018-12-17

## 2018-12-13 RX ORDER — SODIUM CHLORIDE 9 MG/ML
500 INJECTION, SOLUTION INTRAVENOUS ONCE
Qty: 0 | Refills: 0 | Status: COMPLETED | OUTPATIENT
Start: 2018-12-13 | End: 2018-12-13

## 2018-12-13 RX ORDER — ONDANSETRON 8 MG/1
4 TABLET, FILM COATED ORAL ONCE
Qty: 0 | Refills: 0 | Status: DISCONTINUED | OUTPATIENT
Start: 2018-12-13 | End: 2018-12-13

## 2018-12-13 RX ORDER — ONDANSETRON 8 MG/1
4 TABLET, FILM COATED ORAL ONCE
Qty: 0 | Refills: 0 | Status: DISCONTINUED | OUTPATIENT
Start: 2018-12-13 | End: 2018-12-20

## 2018-12-13 RX ORDER — ASPIRIN/CALCIUM CARB/MAGNESIUM 324 MG
81 TABLET ORAL DAILY
Qty: 0 | Refills: 0 | Status: DISCONTINUED | OUTPATIENT
Start: 2018-12-13 | End: 2018-12-13

## 2018-12-13 RX ORDER — ACETAMINOPHEN 500 MG
1000 TABLET ORAL ONCE
Qty: 0 | Refills: 0 | Status: COMPLETED | OUTPATIENT
Start: 2018-12-13 | End: 2018-12-13

## 2018-12-13 RX ADMIN — SODIUM CHLORIDE 3000 MILLILITER(S): 9 INJECTION, SOLUTION INTRAVENOUS at 14:40

## 2018-12-13 RX ADMIN — HYDROMORPHONE HYDROCHLORIDE 0.5 MILLIGRAM(S): 2 INJECTION INTRAMUSCULAR; INTRAVENOUS; SUBCUTANEOUS at 17:15

## 2018-12-13 RX ADMIN — SODIUM CHLORIDE 3000 MILLILITER(S): 9 INJECTION, SOLUTION INTRAVENOUS at 18:31

## 2018-12-13 RX ADMIN — Medication 1500 MILLILITER(S): at 16:01

## 2018-12-13 RX ADMIN — Medication 100 MILLIEQUIVALENT(S): at 14:30

## 2018-12-13 RX ADMIN — HYDROMORPHONE HYDROCHLORIDE 0.5 MILLIGRAM(S): 2 INJECTION INTRAMUSCULAR; INTRAVENOUS; SUBCUTANEOUS at 17:30

## 2018-12-13 RX ADMIN — CHLORHEXIDINE GLUCONATE 5 MILLILITER(S): 213 SOLUTION TOPICAL at 15:57

## 2018-12-13 RX ADMIN — Medication 100 MILLIEQUIVALENT(S): at 15:30

## 2018-12-13 RX ADMIN — NICARDIPINE HYDROCHLORIDE 25 MG/HR: 30 CAPSULE, EXTENDED RELEASE ORAL at 15:55

## 2018-12-13 RX ADMIN — SODIUM CHLORIDE 10 MILLILITER(S): 9 INJECTION INTRAMUSCULAR; INTRAVENOUS; SUBCUTANEOUS at 13:50

## 2018-12-13 RX ADMIN — Medication 100 MILLIGRAM(S): at 17:14

## 2018-12-13 RX ADMIN — Medication 1500 MILLILITER(S): at 17:21

## 2018-12-13 RX ADMIN — Medication 400 MILLIGRAM(S): at 21:13

## 2018-12-13 RX ADMIN — HYDROMORPHONE HYDROCHLORIDE 0.5 MILLIGRAM(S): 2 INJECTION INTRAMUSCULAR; INTRAVENOUS; SUBCUTANEOUS at 23:00

## 2018-12-13 RX ADMIN — FAMOTIDINE 20 MILLIGRAM(S): 10 INJECTION INTRAVENOUS at 17:15

## 2018-12-13 RX ADMIN — CHLORHEXIDINE GLUCONATE 5 MILLILITER(S): 213 SOLUTION TOPICAL at 19:30

## 2018-12-13 RX ADMIN — Medication 125 MILLILITER(S): at 19:50

## 2018-12-13 RX ADMIN — HYDROMORPHONE HYDROCHLORIDE 0.5 MILLIGRAM(S): 2 INJECTION INTRAMUSCULAR; INTRAVENOUS; SUBCUTANEOUS at 23:15

## 2018-12-13 RX ADMIN — Medication 1000 MILLIGRAM(S): at 21:43

## 2018-12-13 NOTE — BRIEF OPERATIVE NOTE - PROCEDURE
<<-----Click on this checkbox to enter Procedure Ascending aortic aneurysm repair  12/13/2018  Patient was taken to the OR on 12/13/18 and underwent repair of ascending aortic aneursym / hemiarch with [id number] gelweave graft and AVR utilizing a #25mm bovine pericardial valve  Active  PGELLERT  AVR (aortic valve replacement)  12/13/2018  Patient was taken to the OR on 12/13/18 and underwent repair of ascending aortic aneursym / hemiarch with [id number] gelweave graft and AVR utilizing a #25mm bovine pericardial valve  Active  PGELLERT

## 2018-12-13 NOTE — BRIEF OPERATIVE NOTE - POST-OP DX
AS (aortic stenosis)  12/13/2018  DUE TO BICUSPID AORTIC VALVE  Saurabh Quintero  Ascending aortic aneurysm  12/13/2018    Saurabh Quintero

## 2018-12-13 NOTE — PROGRESS NOTE ADULT - SUBJECTIVE AND OBJECTIVE BOX
AMY NEELY  MRN#:  [id number]    The patient is a 73y Male with PMH, HTN, and a known ascending aortic aneurysm which was increasing in size, now s/p ascending aortic repair and transverse hemiarch replacement today who was seen, evaluated, & examined with the CTICU staff on rounds and later in the evening with a multidisciplinary care plan formulated & implemented.  All available clinical, laboratory, radiographic, pharmacologic, and electrocardiographic data were reviewed & analyzed.      The patient was in the CTICU in critical condition secondary to persistent cardiopulmonary dysfunction, hemodynamically significant anemia/hypovolemia-shock & hyperglycemia.      Respiratory status required full ventilatory support with subsequent weaning to extubation, the following of ABG’s with A-line monitoring, continuous pulse oximetry monitoring, & intermittent IV Dilaudid for support & to evaluate for & prevent further decompensation secondary to persistent cardiopulmonary dysfunction.     Invasive hemodynamic monitoring with central venous catheter & an A-line were required for continuous central venous and MAP/BP monitoring to ensure adequate cardiovascular support and to evaluate for & help prevent decompensation while receiving intermittent volume expansion secondary to hemodynamically significant anemia/hypovolemia-shock.    Metabolic stability, stress hyperglycemia, & infection prophylaxis required the following of serial glucose levels to help achieve & maintain euglycemia.      Patient required acute critical care management and I provided 30 minutes of non-continuous care to the patient.  Discussed at length with the CTICU staff and helped coordinate care.

## 2018-12-13 NOTE — BRIEF OPERATIVE NOTE - PRE-OP DX
Aortic stenosis due to bicuspid aortic valve  12/13/2018    Active  Saurabh Bond  Ascending aortic aneurysm  12/13/2018    Active  Saurabh Bond

## 2018-12-13 NOTE — PATIENT PROFILE ADULT - BRADEN FRICTION AND SHEAR
Problem: Patient Care Overview (Adult)  Goal: Plan of Care Review  Outcome: Ongoing (interventions implemented as appropriate)    02/17/17 1529   Coping/Psychosocial Response Interventions   Plan Of Care Reviewed With patient   Patient Care Overview   Progress improving       Goal: Adult Individualization and Mutuality  Outcome: Ongoing (interventions implemented as appropriate)  Goal: Discharge Needs Assessment  Outcome: Ongoing (interventions implemented as appropriate)    02/17/17 1529   Discharge Needs Assessment   Concerns To Be Addressed no discharge needs identified            (3) no apparent problem

## 2018-12-13 NOTE — AIRWAY REMOVAL NOTE  ADULT & PEDS - ARTIFICAL AIRWAY REMOVAL COMMENTS
Written order for extubation verified. Pt was identified by full name and birthday compared to ID band.  Present during the procedure was May CABRERA

## 2018-12-14 LAB
ANION GAP SERPL CALC-SCNC: 12 MMOL/L — SIGNIFICANT CHANGE UP (ref 5–17)
APTT BLD: 31 SEC — SIGNIFICANT CHANGE UP (ref 27.5–36.3)
BASOPHILS # BLD AUTO: 0 K/UL — SIGNIFICANT CHANGE UP (ref 0–0.2)
BASOPHILS NFR BLD AUTO: 0 % — SIGNIFICANT CHANGE UP (ref 0–2)
BUN SERPL-MCNC: 17 MG/DL — SIGNIFICANT CHANGE UP (ref 7–23)
CALCIUM SERPL-MCNC: 7.9 MG/DL — LOW (ref 8.4–10.5)
CHLORIDE SERPL-SCNC: 108 MMOL/L — SIGNIFICANT CHANGE UP (ref 96–108)
CO2 SERPL-SCNC: 23 MMOL/L — SIGNIFICANT CHANGE UP (ref 22–31)
CREAT SERPL-MCNC: 0.86 MG/DL — SIGNIFICANT CHANGE UP (ref 0.5–1.3)
EOSINOPHIL # BLD AUTO: 0 K/UL — SIGNIFICANT CHANGE UP (ref 0–0.5)
EOSINOPHIL NFR BLD AUTO: 0.1 % — SIGNIFICANT CHANGE UP (ref 0–6)
GAS PNL BLDA: SIGNIFICANT CHANGE UP
GAS PNL BLDA: SIGNIFICANT CHANGE UP
GLUCOSE SERPL-MCNC: 124 MG/DL — HIGH (ref 70–99)
HCT VFR BLD CALC: 26.5 % — LOW (ref 39–50)
HGB BLD-MCNC: 9.4 G/DL — LOW (ref 13–17)
INR BLD: 1.25 RATIO — HIGH (ref 0.88–1.16)
LYMPHOCYTES # BLD AUTO: 0.5 K/UL — LOW (ref 1–3.3)
LYMPHOCYTES # BLD AUTO: 6.7 % — LOW (ref 13–44)
MCHC RBC-ENTMCNC: 33.1 PG — SIGNIFICANT CHANGE UP (ref 27–34)
MCHC RBC-ENTMCNC: 35.6 GM/DL — SIGNIFICANT CHANGE UP (ref 32–36)
MCV RBC AUTO: 92.9 FL — SIGNIFICANT CHANGE UP (ref 80–100)
MONOCYTES # BLD AUTO: 0.8 K/UL — SIGNIFICANT CHANGE UP (ref 0–0.9)
MONOCYTES NFR BLD AUTO: 11.7 % — SIGNIFICANT CHANGE UP (ref 2–14)
NEUTROPHILS # BLD AUTO: 5.8 K/UL — SIGNIFICANT CHANGE UP (ref 1.8–7.4)
NEUTROPHILS NFR BLD AUTO: 81.4 % — HIGH (ref 43–77)
PLATELET # BLD AUTO: 59 K/UL — LOW (ref 150–400)
POTASSIUM SERPL-MCNC: 4.3 MMOL/L — SIGNIFICANT CHANGE UP (ref 3.5–5.3)
POTASSIUM SERPL-MCNC: 4.7 MMOL/L — SIGNIFICANT CHANGE UP (ref 3.5–5.3)
POTASSIUM SERPL-MCNC: 5.7 MMOL/L — HIGH (ref 3.5–5.3)
POTASSIUM SERPL-SCNC: 4.3 MMOL/L — SIGNIFICANT CHANGE UP (ref 3.5–5.3)
POTASSIUM SERPL-SCNC: 4.7 MMOL/L — SIGNIFICANT CHANGE UP (ref 3.5–5.3)
POTASSIUM SERPL-SCNC: 5.7 MMOL/L — HIGH (ref 3.5–5.3)
PROTHROM AB SERPL-ACNC: 14.3 SEC — HIGH (ref 10–12.9)
RBC # BLD: 2.85 M/UL — LOW (ref 4.2–5.8)
RBC # FLD: 11.9 % — SIGNIFICANT CHANGE UP (ref 10.3–14.5)
SODIUM SERPL-SCNC: 143 MMOL/L — SIGNIFICANT CHANGE UP (ref 135–145)
T3 SERPL-MCNC: 121 NG/DL — SIGNIFICANT CHANGE UP (ref 80–200)
T4 AB SER-ACNC: 6.1 UG/DL — SIGNIFICANT CHANGE UP (ref 4.6–12)
TSH SERPL-MCNC: 3.01 UIU/ML — SIGNIFICANT CHANGE UP (ref 0.27–4.2)
WBC # BLD: 7.2 K/UL — SIGNIFICANT CHANGE UP (ref 3.8–10.5)
WBC # FLD AUTO: 7.2 K/UL — SIGNIFICANT CHANGE UP (ref 3.8–10.5)

## 2018-12-14 PROCEDURE — 93010 ELECTROCARDIOGRAM REPORT: CPT | Mod: 77

## 2018-12-14 PROCEDURE — 93010 ELECTROCARDIOGRAM REPORT: CPT

## 2018-12-14 PROCEDURE — 71045 X-RAY EXAM CHEST 1 VIEW: CPT | Mod: 26

## 2018-12-14 RX ORDER — HYDROMORPHONE HYDROCHLORIDE 2 MG/ML
0.5 INJECTION INTRAMUSCULAR; INTRAVENOUS; SUBCUTANEOUS ONCE
Qty: 0 | Refills: 0 | Status: DISCONTINUED | OUTPATIENT
Start: 2018-12-14 | End: 2018-12-14

## 2018-12-14 RX ORDER — POTASSIUM CHLORIDE 20 MEQ
10 PACKET (EA) ORAL
Qty: 0 | Refills: 0 | Status: COMPLETED | OUTPATIENT
Start: 2018-12-14 | End: 2018-12-14

## 2018-12-14 RX ORDER — METOPROLOL TARTRATE 50 MG
25 TABLET ORAL EVERY 8 HOURS
Qty: 0 | Refills: 0 | Status: DISCONTINUED | OUTPATIENT
Start: 2018-12-14 | End: 2018-12-18

## 2018-12-14 RX ORDER — METOPROLOL TARTRATE 50 MG
25 TABLET ORAL EVERY 12 HOURS
Qty: 0 | Refills: 0 | Status: DISCONTINUED | OUTPATIENT
Start: 2018-12-14 | End: 2018-12-14

## 2018-12-14 RX ORDER — ATORVASTATIN CALCIUM 80 MG/1
40 TABLET, FILM COATED ORAL AT BEDTIME
Qty: 0 | Refills: 0 | Status: DISCONTINUED | OUTPATIENT
Start: 2018-12-14 | End: 2018-12-20

## 2018-12-14 RX ORDER — HYDROMORPHONE HYDROCHLORIDE 2 MG/ML
0.5 INJECTION INTRAMUSCULAR; INTRAVENOUS; SUBCUTANEOUS
Qty: 0 | Refills: 0 | Status: DISCONTINUED | OUTPATIENT
Start: 2018-12-14 | End: 2018-12-20

## 2018-12-14 RX ORDER — FAMOTIDINE 10 MG/ML
20 INJECTION INTRAVENOUS
Qty: 0 | Refills: 0 | Status: DISCONTINUED | OUTPATIENT
Start: 2018-12-14 | End: 2018-12-20

## 2018-12-14 RX ORDER — FUROSEMIDE 40 MG
20 TABLET ORAL ONCE
Qty: 0 | Refills: 0 | Status: COMPLETED | OUTPATIENT
Start: 2018-12-14 | End: 2018-12-14

## 2018-12-14 RX ADMIN — HYDROMORPHONE HYDROCHLORIDE 0.5 MILLIGRAM(S): 2 INJECTION INTRAMUSCULAR; INTRAVENOUS; SUBCUTANEOUS at 02:45

## 2018-12-14 RX ADMIN — FAMOTIDINE 20 MILLIGRAM(S): 10 INJECTION INTRAVENOUS at 16:30

## 2018-12-14 RX ADMIN — HYDROMORPHONE HYDROCHLORIDE 0.5 MILLIGRAM(S): 2 INJECTION INTRAMUSCULAR; INTRAVENOUS; SUBCUTANEOUS at 06:00

## 2018-12-14 RX ADMIN — HYDROMORPHONE HYDROCHLORIDE 0.5 MILLIGRAM(S): 2 INJECTION INTRAMUSCULAR; INTRAVENOUS; SUBCUTANEOUS at 15:00

## 2018-12-14 RX ADMIN — Medication 25 MILLIGRAM(S): at 21:30

## 2018-12-14 RX ADMIN — OXYCODONE AND ACETAMINOPHEN 2 TABLET(S): 5; 325 TABLET ORAL at 12:29

## 2018-12-14 RX ADMIN — Medication 50 MILLIEQUIVALENT(S): at 03:03

## 2018-12-14 RX ADMIN — Medication 25 MILLIGRAM(S): at 16:30

## 2018-12-14 RX ADMIN — HYDROMORPHONE HYDROCHLORIDE 0.5 MILLIGRAM(S): 2 INJECTION INTRAMUSCULAR; INTRAVENOUS; SUBCUTANEOUS at 03:00

## 2018-12-14 RX ADMIN — Medication 20 MILLIGRAM(S): at 06:32

## 2018-12-14 RX ADMIN — Medication 50 MILLIEQUIVALENT(S): at 10:39

## 2018-12-14 RX ADMIN — HYDROMORPHONE HYDROCHLORIDE 0.5 MILLIGRAM(S): 2 INJECTION INTRAMUSCULAR; INTRAVENOUS; SUBCUTANEOUS at 09:26

## 2018-12-14 RX ADMIN — Medication 25 MILLIGRAM(S): at 07:31

## 2018-12-14 RX ADMIN — HYDROMORPHONE HYDROCHLORIDE 0.5 MILLIGRAM(S): 2 INJECTION INTRAMUSCULAR; INTRAVENOUS; SUBCUTANEOUS at 09:06

## 2018-12-14 RX ADMIN — Medication 100 MILLIGRAM(S): at 13:50

## 2018-12-14 RX ADMIN — OXYCODONE AND ACETAMINOPHEN 2 TABLET(S): 5; 325 TABLET ORAL at 20:30

## 2018-12-14 RX ADMIN — Medication 100 MILLIGRAM(S): at 09:07

## 2018-12-14 RX ADMIN — HYDROMORPHONE HYDROCHLORIDE 0.5 MILLIGRAM(S): 2 INJECTION INTRAMUSCULAR; INTRAVENOUS; SUBCUTANEOUS at 06:15

## 2018-12-14 RX ADMIN — Medication 100 MILLIGRAM(S): at 16:30

## 2018-12-14 RX ADMIN — ATORVASTATIN CALCIUM 40 MILLIGRAM(S): 80 TABLET, FILM COATED ORAL at 21:30

## 2018-12-14 RX ADMIN — Medication 50 MILLIEQUIVALENT(S): at 02:14

## 2018-12-14 RX ADMIN — OXYCODONE AND ACETAMINOPHEN 2 TABLET(S): 5; 325 TABLET ORAL at 11:59

## 2018-12-14 RX ADMIN — Medication 50 MILLIEQUIVALENT(S): at 09:07

## 2018-12-14 RX ADMIN — HYDROMORPHONE HYDROCHLORIDE 0.5 MILLIGRAM(S): 2 INJECTION INTRAMUSCULAR; INTRAVENOUS; SUBCUTANEOUS at 15:30

## 2018-12-14 RX ADMIN — OXYCODONE AND ACETAMINOPHEN 2 TABLET(S): 5; 325 TABLET ORAL at 19:41

## 2018-12-14 RX ADMIN — Medication 50 MILLIEQUIVALENT(S): at 10:40

## 2018-12-14 NOTE — PHYSICAL THERAPY INITIAL EVALUATION ADULT - ADDITIONAL COMMENTS
Pt lives with wife in private house, no stairs to enter, 1 flight to bedroom (+)rail. Was independent ambulator without assistive device pta. Drives.

## 2018-12-14 NOTE — CONSULT NOTE ADULT - ASSESSMENT
74 yo male PMHx HTN, AAA, recent echocardiogram on 11/15/18 revealed an increase in size on AAA to 5.25 and calcified aortic valve, presents for AAA repair and AVR. Pt sp ascending aortic aneurysm repair and aortic valve replacement with bovine pericardial valve POD # 1. Medicine consult for comanagement.    # AAA repair and AVR POD#1  - appreciate great care per CTS  - SDU care  - pain control, bowel regimen  - statin  - nicardipine and levophed per CTS  - chest tubes per CTS  - advance diet    # HTN  - cont BB    PMD Dr. Ren [facility]

## 2018-12-14 NOTE — PHYSICAL THERAPY INITIAL EVALUATION ADULT - PERTINENT HX OF CURRENT PROBLEM, REHAB EVAL
73 y/oM admitted 12/13 with aortic stenosis d/t bicuspid AV and ascending aortic aneurysm. Ascending aortic aneurysm repiar and AVR using bovine percardial valve performed.

## 2018-12-14 NOTE — CONSULT NOTE ADULT - ATTENDING COMMENTS
Thank you for this consult. Please call [facility] Hospitalist with questions [phone].

## 2018-12-14 NOTE — PROGRESS NOTE ADULT - ASSESSMENT
This is a 73M s/p hemicarch replaced of ascending aorta/AVR -T  post-op diurese/BB/asa/ Statin  tr to SDU POD #1- maintain CT's  percocet for pain

## 2018-12-14 NOTE — CONSULT NOTE ADULT - SUBJECTIVE AND OBJECTIVE BOX
Patient is a 73y old  Male who presents with a chief complaint of sob (14 Dec 2018 11:11)      HPI:  72 yo male, PMH HTN, with known AAA for about 2 years, reports PISANO that he first noticed last winter when shoveling snow and has become worse, recent echocardiogram on 11/15/18 revealed an increase in size on AAA to 5.25 and calcified aortic valve. Pt. presents to PST today for scheduled Hemiarch aortic Valve Replacement/right Radial A-line on 12/13/18. Pt. denies recent fever, chills, chest pain, palpitations, changes in bowel/urinary habits. (05 Dec 2018 11:42) from admitting H&P      PAST MEDICAL & SURGICAL HISTORY:  Hemorrhoids  OA (osteoarthritis) of knee: left  Aortic valve disease  Ascending aortic aneurysm  HTN (hypertension)  S/P cataract surgery: bilateral  H/O foot surgery: bilateral &quot;ball of foot&quot;      FAMILY HISTORY:  No pertinent family history in first degree relatives      SOCIAL HISTORY:    Allergies    No Known Allergies    Intolerances        REVIEW OF SYSTEMS    General:	+ fatigue, denies fevers, chills, sweats, decreased appetite.    Skin/Breast: denies pruritis, rash  	  Ophthalmologic: no change in vision or blurring  	  HEENT	Denies dry mouth, oral sores, dysphagia,  change in hearing, + thirst    Respiratory and Thorax: no cough, sob, wheeze, hemoptysis, + pain with deep inspiration  	  Cardiovascular:	+cp ,  nopalp, orthopnea    Gastrointestinal:	no n/v/d constipation    Genitourinary:	no dysuria of frequency, no hematuria, no flank pain    Musculoskeletal:	no bone or joint pain. no muscle aches.     Neurological:	no change in sensory or motor function. no headache. no weakness.     Psychiatric:	no depression, no anxiety, insomnia.     Hematology/Lymphatics:	no bleeding or bruising    SUBJECTIVE / OVERNIGHT EVENTS:    pt transferred from ICU to SDU today. co pain with deep inspiration. denies active cp. denies cough. co poor appetite.     Vital Signs Last 24 Hrs  T(C): 36.8 (14 Dec 2018 12:00), Max: 38.3 (13 Dec 2018 20:00)  T(F): 98.3 (14 Dec 2018 12:00), Max: 100.9 (13 Dec 2018 20:00)  HR: 80 (14 Dec 2018 12:00) (57 - 84)  BP: 132/63 (14 Dec 2018 12:00) (132/63 - 132/63)  BP(mean): --  RR: 20 (14 Dec 2018 12:00) (0 - 40)  SpO2: 96% (14 Dec 2018 12:00) (90% - 100%)  I&O's Summary    13 Dec 2018 07:01  -  14 Dec 2018 07:00  --------------------------------------------------------  IN: 2072.4 mL / OUT: 2590 mL / NET: -517.6 mL    14 Dec 2018 07:01  -  14 Dec 2018 12:04  --------------------------------------------------------  IN: 200 mL / OUT: 535 mL / NET: -335 mL        PHYSICAL EXAM:  GENERAL: NAD, WNWD  HEAD:  Atraumatic, Normocephalic  EYES: EOMI, PERRLA, conjunctiva and sclera clear  NECK: Supple, right CVC  CHEST/LUNG: coarse bs, dressing midline, bl chest tubes  HEART: Regular rate and rhythm; No murmurs, rubs, or gallops  ABDOMEN: Soft, Nontender, Nondistended; Bowel sounds present  Neuro: AAOx3, no focal deficit, 5/5 b/l extremities  EXTREMITIES:  2+ Peripheral Pulses, No clubbing, cyanosis, or edema  SKIN: No rashes or lesions    LABS:                        9.4    7.2   )-----------( 59       ( 14 Dec 2018 02:36 )             26.5     12-14    143  |  108  |  17  ----------------------------<  124<H>  4.7   |  23  |  0.86    Ca    7.9<L>      14 Dec 2018 02:36    TPro  4.8<L>  /  Alb  3.4  /  TBili  0.8  /  DBili  x   /  AST  26  /  ALT  13  /  AlkPhos  38<L>  12-13    PT/INR - ( 14 Dec 2018 02:36 )   PT: 14.3 sec;   INR: 1.25 ratio         PTT - ( 14 Dec 2018 02:36 )  PTT:31.0 sec  CAPILLARY BLOOD GLUCOSE  116 (14 Dec 2018 08:00)  124 (14 Dec 2018 03:00)  121 (14 Dec 2018 01:00)  123 (13 Dec 2018 23:00)  125 (13 Dec 2018 22:00)  129 (13 Dec 2018 20:00)  128 (13 Dec 2018 19:00)  127 (13 Dec 2018 16:00)      POCT Blood Glucose.: 125 mg/dL (13 Dec 2018 22:17)  POCT Blood Glucose.: 127 mg/dL (13 Dec 2018 21:17)  POCT Blood Glucose.: 129 mg/dL (13 Dec 2018 20:11)  POCT Blood Glucose.: 127 mg/dL (13 Dec 2018 16:11)    CARDIAC MARKERS ( 13 Dec 2018 14:06 )  x     / x     / 339 U/L / x     / 10.6 ng/mL          RADIOLOGY & ADDITIONAL TESTS:    Imaging Personally Reviewed:  [x] YES  [ ] NO    Consultant(s) Notes Reviewed:  [x] YES  [ ] NO      MEDICATIONS  (STANDING):  atorvastatin 40 milliGRAM(s) Oral at bedtime  cefuroxime  IVPB 1500 milliGRAM(s) IV Intermittent every 8 hours  chlorhexidine 0.12% Liquid 5 milliLiter(s) Oral Mucosa every 4 hours  docusate sodium 100 milliGRAM(s) Oral three times a day  famotidine    Tablet 20 milliGRAM(s) Oral two times a day  metoprolol tartrate 25 milliGRAM(s) Oral every 12 hours  niCARdipine Infusion 5 mG/Hr (25 mL/Hr) IV Continuous <Continuous>  norepinephrine Infusion 0.05 MICROgram(s)/kG/Min (10.106 mL/Hr) IV Continuous <Continuous>  potassium chloride  10 mEq/50 mL IVPB 10 milliEquivalent(s) IV Intermittent every 1 hour  potassium chloride  10 mEq/50 mL IVPB 10 milliEquivalent(s) IV Intermittent every 1 hour  potassium chloride  10 mEq/50 mL IVPB 10 milliEquivalent(s) IV Intermittent every 1 hour  sodium chloride 0.9%. 1000 milliLiter(s) (10 mL/Hr) IV Continuous <Continuous>    MEDICATIONS  (PRN):  ondansetron Injectable 4 milliGRAM(s) IV Push once PRN Nausea and/or Vomiting  oxyCODONE    5 mG/acetaminophen 325 mG 1 Tablet(s) Oral every 4 hours PRN Mild Pain (1 - 3)  oxyCODONE    5 mG/acetaminophen 325 mG 2 Tablet(s) Oral every 6 hours PRN Moderate Pain (4 - 6)      Care Discussed with Consultants/Other Providers [x] YES  [ ] NO    HEALTH ISSUES - PROBLEM Dx:  Prophylactic measure: Prophylactic measure  KRISTINE (obstructive sleep apnea): KRISTINE (obstructive sleep apnea)  HTN (hypertension): HTN (hypertension)  Ascending aortic aneurysm: Ascending aortic aneurysm

## 2018-12-14 NOTE — PROVIDER CONTACT NOTE (OTHER) - ACTION/TREATMENT ORDERED:
NP Bisca aware, EKG done, serum potassium drawn and sent NP Bisca aware, EKG done, serum potassium drawn and sent. Pt safety maintained. Will continue to monitor HR/Rhythm. Call bell within easy reach.

## 2018-12-14 NOTE — PROGRESS NOTE ADULT - SUBJECTIVE AND OBJECTIVE BOX
VITAL SIGNS-Telemetry:    Vital Signs Last 24 Hrs  T(C): 37.6 (12-14-18 @ 08:00), Max: 38.3 (12-13-18 @ 20:00)  T(F): 99.7 (12-14-18 @ 08:00), Max: 100.9 (12-13-18 @ 20:00)  HR: 76 (12-14-18 @ 10:20) (57 - 84)  BP: --  RR: 34 (12-14-18 @ 10:00) (0 - 40)  SpO2: 98% (12-14-18 @ 10:20) (90% - 100%)         12-13 @ 07:01  -  12-14 @ 07:00  --------------------------------------------------------  IN: 2072.4 mL / OUT: 2590 mL / NET: -517.6 mL    12-14 @ 07:01  -  12-14 @ 11:12  --------------------------------------------------------  IN: 200 mL / OUT: 535 mL / NET: -335 mL    Daily     Daily     CAPILLARY BLOOD GLUCOSE  116 (14 Dec 2018 08:00)  124 (14 Dec 2018 03:00)  121 (14 Dec 2018 01:00)  123 (13 Dec 2018 23:00)    Drains:     MS         [  ] Drainage:                 L Pleural  [  ]  Drainage:                R Pleural  [  ]  Drainage:  Pacing Wires        [  ]   Settings:                                  Isolated  [ x]  Coumadin    [ ] YES          [  ]      NO         Reason:       PHYSICAL EXAM:  Neurology: alert and oriented x 3, nonfocal, no gross deficits  CV : S1S2  Sternal Wound :  CDI , Stable  Lungs: crackles bases  Abdomen: soft, nontender, nondistended, positive bowel sounds, last bowel movement         Extremities:     tr. edema, no calf tenderness    atorvastatin 40 milliGRAM(s) Oral at bedtime  cefuroxime  IVPB 1500 milliGRAM(s) IV Intermittent every 8 hours  chlorhexidine 0.12% Liquid 5 milliLiter(s) Oral Mucosa every 4 hours  dextrose 50% Injectable 50 milliLiter(s) IV Push every 15 minutes  dextrose 50% Injectable 25 milliLiter(s) IV Push every 15 minutes  docusate sodium 100 milliGRAM(s) Oral three times a day  famotidine    Tablet 20 milliGRAM(s) Oral two times a day  insulin Infusion 2 Unit(s)/Hr IV Continuous <Continuous>  metoprolol tartrate 25 milliGRAM(s) Oral every 12 hours  niCARdipine Infusion 5 mG/Hr IV Continuous <Continuous>  norepinephrine Infusion 0.05 MICROgram(s)/kG/Min IV Continuous <Continuous>  ondansetron Injectable 4 milliGRAM(s) IV Push once PRN  oxyCODONE    5 mG/acetaminophen 325 mG 1 Tablet(s) Oral every 4 hours PRN  oxyCODONE    5 mG/acetaminophen 325 mG 2 Tablet(s) Oral every 6 hours PRN  potassium chloride  10 mEq/50 mL IVPB 10 milliEquivalent(s) IV Intermittent every 1 hour  potassium chloride  10 mEq/50 mL IVPB 10 milliEquivalent(s) IV Intermittent every 1 hour  potassium chloride  10 mEq/50 mL IVPB 10 milliEquivalent(s) IV Intermittent every 1 hour  sodium chloride 0.9%. 1000 milliLiter(s) IV Continuous <Continuous>      Physical Therapy Rec:   Home  [  ]   Home w/ PT  [  ]  Rehab  [  ]  Discussed with Cardiothoracic Team at AM rounds.

## 2018-12-14 NOTE — PHYSICAL THERAPY INITIAL EVALUATION ADULT - PLANNED THERAPY INTERVENTIONS, PT EVAL
gait training/transfer training/stair training- GOAL: 12 steps with single rail independent, by discharge/bed mobility training

## 2018-12-15 DIAGNOSIS — Z98.890 OTHER SPECIFIED POSTPROCEDURAL STATES: ICD-10-CM

## 2018-12-15 DIAGNOSIS — D69.6 THROMBOCYTOPENIA, UNSPECIFIED: ICD-10-CM

## 2018-12-15 LAB
ANION GAP SERPL CALC-SCNC: 9 MMOL/L — SIGNIFICANT CHANGE UP (ref 5–17)
BUN SERPL-MCNC: 24 MG/DL — HIGH (ref 7–23)
CALCIUM SERPL-MCNC: 7.7 MG/DL — LOW (ref 8.4–10.5)
CHLORIDE SERPL-SCNC: 103 MMOL/L — SIGNIFICANT CHANGE UP (ref 96–108)
CO2 SERPL-SCNC: 25 MMOL/L — SIGNIFICANT CHANGE UP (ref 22–31)
CREAT SERPL-MCNC: 0.73 MG/DL — SIGNIFICANT CHANGE UP (ref 0.5–1.3)
GLUCOSE SERPL-MCNC: 122 MG/DL — HIGH (ref 70–99)
HCT VFR BLD CALC: 26.5 % — LOW (ref 39–50)
HGB BLD-MCNC: 9.2 G/DL — LOW (ref 13–17)
MCHC RBC-ENTMCNC: 32.6 PG — SIGNIFICANT CHANGE UP (ref 27–34)
MCHC RBC-ENTMCNC: 34.8 GM/DL — SIGNIFICANT CHANGE UP (ref 32–36)
MCV RBC AUTO: 93.4 FL — SIGNIFICANT CHANGE UP (ref 80–100)
PLATELET # BLD AUTO: 55 K/UL — LOW (ref 150–400)
POTASSIUM SERPL-MCNC: 4.2 MMOL/L — SIGNIFICANT CHANGE UP (ref 3.5–5.3)
POTASSIUM SERPL-SCNC: 4.2 MMOL/L — SIGNIFICANT CHANGE UP (ref 3.5–5.3)
RBC # BLD: 2.83 M/UL — LOW (ref 4.2–5.8)
RBC # FLD: 11.9 % — SIGNIFICANT CHANGE UP (ref 10.3–14.5)
SODIUM SERPL-SCNC: 137 MMOL/L — SIGNIFICANT CHANGE UP (ref 135–145)
WBC # BLD: 10.6 K/UL — HIGH (ref 3.8–10.5)
WBC # FLD AUTO: 10.6 K/UL — HIGH (ref 3.8–10.5)

## 2018-12-15 PROCEDURE — 71045 X-RAY EXAM CHEST 1 VIEW: CPT | Mod: 26

## 2018-12-15 RX ORDER — HYDROMORPHONE HYDROCHLORIDE 2 MG/ML
0.5 INJECTION INTRAMUSCULAR; INTRAVENOUS; SUBCUTANEOUS ONCE
Qty: 0 | Refills: 0 | Status: DISCONTINUED | OUTPATIENT
Start: 2018-12-15 | End: 2018-12-20

## 2018-12-15 RX ORDER — KETOROLAC TROMETHAMINE 30 MG/ML
15 SYRINGE (ML) INJECTION EVERY 8 HOURS
Qty: 0 | Refills: 0 | Status: DISCONTINUED | OUTPATIENT
Start: 2018-12-15 | End: 2018-12-16

## 2018-12-15 RX ORDER — KETOROLAC TROMETHAMINE 30 MG/ML
30 SYRINGE (ML) INJECTION ONCE
Qty: 0 | Refills: 0 | Status: DISCONTINUED | OUTPATIENT
Start: 2018-12-15 | End: 2018-12-15

## 2018-12-15 RX ADMIN — HYDROMORPHONE HYDROCHLORIDE 0.5 MILLIGRAM(S): 2 INJECTION INTRAMUSCULAR; INTRAVENOUS; SUBCUTANEOUS at 05:01

## 2018-12-15 RX ADMIN — Medication 15 MILLIGRAM(S): at 21:32

## 2018-12-15 RX ADMIN — HYDROMORPHONE HYDROCHLORIDE 0.5 MILLIGRAM(S): 2 INJECTION INTRAMUSCULAR; INTRAVENOUS; SUBCUTANEOUS at 17:10

## 2018-12-15 RX ADMIN — HYDROMORPHONE HYDROCHLORIDE 0.5 MILLIGRAM(S): 2 INJECTION INTRAMUSCULAR; INTRAVENOUS; SUBCUTANEOUS at 05:10

## 2018-12-15 RX ADMIN — OXYCODONE AND ACETAMINOPHEN 2 TABLET(S): 5; 325 TABLET ORAL at 10:54

## 2018-12-15 RX ADMIN — Medication 30 MILLIGRAM(S): at 12:30

## 2018-12-15 RX ADMIN — FAMOTIDINE 20 MILLIGRAM(S): 10 INJECTION INTRAVENOUS at 05:11

## 2018-12-15 RX ADMIN — OXYCODONE AND ACETAMINOPHEN 2 TABLET(S): 5; 325 TABLET ORAL at 11:24

## 2018-12-15 RX ADMIN — OXYCODONE AND ACETAMINOPHEN 2 TABLET(S): 5; 325 TABLET ORAL at 05:10

## 2018-12-15 RX ADMIN — Medication 100 MILLIGRAM(S): at 21:32

## 2018-12-15 RX ADMIN — Medication 25 MILLIGRAM(S): at 21:32

## 2018-12-15 RX ADMIN — Medication 100 MILLIGRAM(S): at 05:11

## 2018-12-15 RX ADMIN — Medication 30 MILLIGRAM(S): at 13:00

## 2018-12-15 RX ADMIN — FAMOTIDINE 20 MILLIGRAM(S): 10 INJECTION INTRAVENOUS at 17:09

## 2018-12-15 RX ADMIN — Medication 25 MILLIGRAM(S): at 05:11

## 2018-12-15 RX ADMIN — Medication 25 MILLIGRAM(S): at 15:11

## 2018-12-15 RX ADMIN — HYDROMORPHONE HYDROCHLORIDE 0.5 MILLIGRAM(S): 2 INJECTION INTRAMUSCULAR; INTRAVENOUS; SUBCUTANEOUS at 11:04

## 2018-12-15 RX ADMIN — Medication 100 MILLIGRAM(S): at 15:11

## 2018-12-15 RX ADMIN — Medication 15 MILLIGRAM(S): at 22:00

## 2018-12-15 RX ADMIN — HYDROMORPHONE HYDROCHLORIDE 0.5 MILLIGRAM(S): 2 INJECTION INTRAMUSCULAR; INTRAVENOUS; SUBCUTANEOUS at 11:34

## 2018-12-15 RX ADMIN — HYDROMORPHONE HYDROCHLORIDE 0.5 MILLIGRAM(S): 2 INJECTION INTRAMUSCULAR; INTRAVENOUS; SUBCUTANEOUS at 16:40

## 2018-12-15 RX ADMIN — OXYCODONE AND ACETAMINOPHEN 2 TABLET(S): 5; 325 TABLET ORAL at 04:48

## 2018-12-15 RX ADMIN — Medication 100 MILLIGRAM(S): at 01:06

## 2018-12-15 RX ADMIN — ATORVASTATIN CALCIUM 40 MILLIGRAM(S): 80 TABLET, FILM COATED ORAL at 21:32

## 2018-12-15 NOTE — PROGRESS NOTE ADULT - ASSESSMENT
74 yo male PMHx HTN, AAA, recent echocardiogram on 11/15/18 revealed an increase in size on AAA to 5.25 and calcified aortic valve, presents for AAA repair and AVR. Pt sp ascending aortic aneurysm repair and aortic valve replacement with bovine pericardial valve POD # 1. Medicine following for comanagement.    # AAA repair and AVR POD#2  - appreciate great care per CTS  - pain control, bowel regimen  - statin  - chest tubes per CTS  - advance diet    # HTN  - cont BB    PMD Dr. [facility]

## 2018-12-15 NOTE — PROGRESS NOTE ADULT - SUBJECTIVE AND OBJECTIVE BOX
Patient is a 73y old  Male who presents with a chief complaint of AAA repair (14 Dec 2018 12:03)      SUBJECTIVE / OVERNIGHT EVENTS:    Patient seen and examined. pain improved today. most pain in right chest tube. off drips. still pain with deep inspiration. denies cough nvd abd pain.      Vital Signs Last 24 Hrs  T(C): 37.1 (15 Dec 2018 07:57), Max: 37.3 (14 Dec 2018 15:25)  T(F): 98.7 (15 Dec 2018 07:57), Max: 99.2 (14 Dec 2018 23:00)  HR: 77 (15 Dec 2018 07:57) (70 - 87)  BP: 129/70 (15 Dec 2018 07:57) (117/55 - 139/73)  BP(mean): 91 (15 Dec 2018 07:57) (79 - 99)  RR: 18 (15 Dec 2018 07:57) (18 - 20)  SpO2: 98% (15 Dec 2018 07:57) (91% - 99%)  I&O's Summary    14 Dec 2018 07:01  -  15 Dec 2018 07:00  --------------------------------------------------------  IN: 710 mL / OUT: 1880 mL / NET: -1170 mL    15 Dec 2018 07:01  -  15 Dec 2018 11:52  --------------------------------------------------------  IN: 240 mL / OUT: 345 mL / NET: -105 mL        PE:  GENERAL: NAD, AAOx3  HEAD:  Atraumatic, Normocephalic  EYES: EOMI, PERRLA, conjunctiva and sclera clear  NECK: Supple, CVC  CHEST/LUNG: coarse bs, minor rales at bases, R and L chest tubes  HEART: Regular rate and rhythm; + murmur  ABDOMEN: Soft, Nontender, Nondistended; Bowel sounds present  : manriquez  EXTREMITIES:  2+ Peripheral Pulses, No clubbing, cyanosis, or edema  SKIN: No rashes or lesions  NEURO: No focal deficits    LABS:                        9.2    10.6  )-----------( 55       ( 15 Dec 2018 04:40 )             26.5     12-15    137  |  103  |  24<H>  ----------------------------<  122<H>  4.2   |  25  |  0.73    Ca    7.7<L>      15 Dec 2018 04:40    TPro  4.8<L>  /  Alb  3.4  /  TBili  0.8  /  DBili  x   /  AST  26  /  ALT  13  /  AlkPhos  38<L>  12-13    PT/INR - ( 14 Dec 2018 02:36 )   PT: 14.3 sec;   INR: 1.25 ratio         PTT - ( 14 Dec 2018 02:36 )  PTT:31.0 sec  CAPILLARY BLOOD GLUCOSE      POCT Blood Glucose.: 121 mg/dL (14 Dec 2018 14:25)    CARDIAC MARKERS ( 13 Dec 2018 14:06 )  x     / x     / 339 U/L / x     / 10.6 ng/mL          RADIOLOGY & ADDITIONAL TESTS:    Imaging Personally Reviewed:  [x] YES  [ ] NO    Consultant(s) Notes Reviewed:  [x] YES  [ ] NO    MEDICATIONS  (STANDING):  atorvastatin 40 milliGRAM(s) Oral at bedtime  chlorhexidine 0.12% Liquid 5 milliLiter(s) Oral Mucosa every 4 hours  docusate sodium 100 milliGRAM(s) Oral three times a day  famotidine    Tablet 20 milliGRAM(s) Oral two times a day  HYDROmorphone  Injectable 0.5 milliGRAM(s) IV Push once  ketorolac   Injectable 30 milliGRAM(s) IV Push once  metoprolol tartrate 25 milliGRAM(s) Oral every 8 hours  niCARdipine Infusion 5 mG/Hr (25 mL/Hr) IV Continuous <Continuous>  norepinephrine Infusion 0.05 MICROgram(s)/kG/Min (10.106 mL/Hr) IV Continuous <Continuous>  potassium chloride  10 mEq/50 mL IVPB 10 milliEquivalent(s) IV Intermittent every 1 hour  potassium chloride  10 mEq/50 mL IVPB 10 milliEquivalent(s) IV Intermittent every 1 hour  potassium chloride  10 mEq/50 mL IVPB 10 milliEquivalent(s) IV Intermittent every 1 hour  sodium chloride 0.9%. 1000 milliLiter(s) (10 mL/Hr) IV Continuous <Continuous>    MEDICATIONS  (PRN):  HYDROmorphone  Injectable 0.5 milliGRAM(s) IV Push every 3 hours PRN Severe Pain (7 - 10)  ondansetron Injectable 4 milliGRAM(s) IV Push once PRN Nausea and/or Vomiting  oxyCODONE    5 mG/acetaminophen 325 mG 1 Tablet(s) Oral every 4 hours PRN Mild Pain (1 - 3)  oxyCODONE    5 mG/acetaminophen 325 mG 2 Tablet(s) Oral every 6 hours PRN Moderate Pain (4 - 6)      Care Discussed with Consultants/Other Providers [x] YES  [ ] NO    HEALTH ISSUES - PROBLEM Dx:  Prophylactic measure: Prophylactic measure  KRISTINE (obstructive sleep apnea): KRISTINE (obstructive sleep apnea)  HTN (hypertension): HTN (hypertension)  Ascending aortic aneurysm: Ascending aortic aneurysm

## 2018-12-15 NOTE — PROGRESS NOTE ADULT - PROBLEM SELECTOR PLAN 2
Continue BP meds as directed, including am of surgery with sip of water Continue to monitor   Send HIT

## 2018-12-15 NOTE — PROGRESS NOTE ADULT - PROBLEM SELECTOR PLAN 1
Hemiarch aortic Valve Replacement/AVR-T Continue with Lopressor 25 mg PO TID.   Continue with Statin   Increase activity as tolerated.   Maintain MS CT x 2 --> pleural vac to LWS   Maintain PW --> EPM  Pain Management --> Toradol 15 mg IV every 8 hours x 24 hours   Encourage Chest PT / Pulmonary toileting and Incentive spirometry every 1 hour x 10 while awake.   Continue with PUD and DVT prophylaxis.   Shower on POD #5.   D/C plan home once medically cleared   Plan of care discussed with attending

## 2018-12-15 NOTE — PROGRESS NOTE ADULT - SUBJECTIVE AND OBJECTIVE BOX
VITAL SIGNS    Subjective: "I want these tubes out." (+) MS incisional pain.  Denies palpitation, SOB, PISANO, HA, dizziness, N/V/D, fever or chills.  No acute event noted overnight.     Telemetry:  NSR 60-80 9 (5 beats WCT)    Vital Signs Last 24 Hrs  T(C): 36.8 (12-15-18 @ 14:56), Max: 37.3 (18 @ 23:00)  T(F): 98.2 (12-15-18 @ 14:56), Max: 99.2 (18 @ 23:00)  HR: 72 (12-15-18 @ 14:56) (70 - 79)  BP: 113/60 (12-15-18 @ 14:56) (113/60 - 139/73)  RR: 18 (12-15-18 @ 14:56) (18 - 20)  SpO2: 99% (12-15-18 @ 14:56) (97% - 99%)           @ 07:01  -  12-15 @ 07:00  --------------------------------------------------------  IN: 710 mL / OUT: 1880 mL / NET: -1170 mL    12-15 @ 07:01  -  12-15 @ 16:48  --------------------------------------------------------  IN: 480 mL / OUT: 715 mL / NET: -235 mL    Daily     Daily Weight in k (15 Dec 2018 05:54)    Drains: MS x 2 [ X ] Drainage: Pleural vac -->negative suction; negative air leak                  Pacing Wires        [ X ]   Settings: VVI                               PHYSICAL EXAM    Neurology: alert and oriented x 3, nonfocal, no gross deficits    CV: (+) S1 and S2, No murmurs, rubs, gallops or clicks     Sternal Wound:  MSI -->CDI , sternum stable; PW --> EPM; MS CT --> Pleural vac -->negative suction; negative air leak    Lungs: CTA B/L     Abdomen: soft, nontender, nondistended, positive bowel sounds, (+) Flatus; (+) BM     :  Voiding               Extremities:  B/L LE (+) edema; negative calf tenderness; (+) 2 DP palpable        atorvastatin 40 milliGRAM(s) Oral at bedtime  chlorhexidine 0.12% Liquid 5 milliLiter(s) Oral Mucosa every 4 hours  docusate sodium 100 milliGRAM(s) Oral three times a day  famotidine Tablet 20 milliGRAM(s) Oral two times a day  HYDROmorphone  Injectable 0.5 milliGRAM(s) IV Push once  HYDROmorphone  Injectable 0.5 milliGRAM(s) IV Push every 3 hours PRN  metoprolol tartrate 25 milliGRAM(s) Oral every 8 hours  niCARdipine Infusion 5 mG/Hr IV Continuous <Continuous>  norepinephrine Infusion 0.05 MICROgram(s)/kG/Min IV Continuous <Continuous>  ondansetron Injectable 4 milliGRAM(s) IV Push once PRN  oxyCODONE  5 mG/acetaminophen 325 mG 1 Tablet(s) Oral every 4 hours PRN  oxyCODONE  5 mG/acetaminophen 325 mG 2 Tablet(s) Oral every 6 hours PRN    Physical Therapy Rec:   Home  [  ]   Home w/ PT  [ X ]  Rehab  [  ]    Discussed with Cardiothoracic Team at AM rounds. VITAL SIGNS    Subjective: "I want these tubes out." (+) MS incisional pain.  Denies palpitation, SOB, PISANO, HA, dizziness, N/V/D, fever or chills.  No acute event noted overnight.     Telemetry:  NSR 60-80 9 (5 beats WCT)    Vital Signs Last 24 Hrs  T(C): 36.8 (12-15-18 @ 14:56), Max: 37.3 (18 @ 23:00)  T(F): 98.2 (12-15-18 @ 14:56), Max: 99.2 (18 @ 23:00)  HR: 72 (12-15-18 @ 14:56) (70 - 79)  BP: 113/60 (12-15-18 @ 14:56) (113/60 - 139/73)  RR: 18 (12-15-18 @ 14:56) (18 - 20)  SpO2: 99% (12-15-18 @ 14:56) (97% - 99%)           @ 07:01  -  12-15 @ 07:00  --------------------------------------------------------  IN: 710 mL / OUT: 1880 mL / NET: -1170 mL    12-15 @ 07:01  -  12-15 @ 16:48  --------------------------------------------------------  IN: 480 mL / OUT: 715 mL / NET: -235 mL    Daily     Daily Weight in k (15 Dec 2018 05:54)    Drains: MS x 2 [ X ] Drainage: Pleural vac -->negative suction; negative air leak                  Pacing Wires: [ X ]   Settings: VVI                               PHYSICAL EXAM    Neurology: alert and oriented x 3, nonfocal, no gross deficits    CV: (+) S1 and S2, No murmurs, rubs, gallops or clicks     Sternal Wound:  MSI -->CDI , sternum stable; PW --> EPM; MS CT --> Pleural vac -->negative suction; negative air leak    Lungs: CTA B/L     Abdomen: soft, nontender, nondistended, positive bowel sounds, (+) Flatus; (+) BM     :  Voiding               Extremities:  B/L LE (+) edema; negative calf tenderness; (+) 2 DP palpable        atorvastatin 40 milliGRAM(s) Oral at bedtime  chlorhexidine 0.12% Liquid 5 milliLiter(s) Oral Mucosa every 4 hours  docusate sodium 100 milliGRAM(s) Oral three times a day  famotidine Tablet 20 milliGRAM(s) Oral two times a day  HYDROmorphone  Injectable 0.5 milliGRAM(s) IV Push once  HYDROmorphone  Injectable 0.5 milliGRAM(s) IV Push every 3 hours PRN  metoprolol tartrate 25 milliGRAM(s) Oral every 8 hours  niCARdipine Infusion 5 mG/Hr IV Continuous <Continuous>  norepinephrine Infusion 0.05 MICROgram(s)/kG/Min IV Continuous <Continuous>  ondansetron Injectable 4 milliGRAM(s) IV Push once PRN  oxyCODONE  5 mG/acetaminophen 325 mG 1 Tablet(s) Oral every 4 hours PRN  oxyCODONE  5 mG/acetaminophen 325 mG 2 Tablet(s) Oral every 6 hours PRN    Physical Therapy Rec:   Home  [  ]   Home w/ PT  [ X ]  Rehab  [  ]    Discussed with Cardiothoracic Team at AM rounds.

## 2018-12-15 NOTE — PROGRESS NOTE ADULT - ASSESSMENT
73M s/p hemicarch replaced of ascending aorta/AVR -T  post-op Course:   diurese/BB/asa/ Statin  tr to SDU POD #1- maintain CT's  percocet for pain 74 yo male, PMH HTN, with known AAA for about 2 years, reports PISANO that he first noticed last winter when shoveling snow and has become worse, recent echocardiogram on 11/15/18 revealed an increase in size on AAA to 5.25 and calcified aortic valve. Pt. presents to PST today for scheduled Hemiarch aortic Valve Replacement/  On 12/13 s/p hemiarch replaced of ascending aorta/AVR -T  Post-op Course:   HTN requiring Cardene gtt --> Weaned off on lopressor 25 mg PO TID    diurese/BB/asa/ Statin  Transferred to SDU POD #1- maintain CT's  12/15 VVS; Maintain MS CT x 2 2/2 drainage.  Toradol 30 mg IV x 1. Plts 55,000 send HIT.

## 2018-12-16 DIAGNOSIS — Z96.89 PRESENCE OF OTHER SPECIFIED FUNCTIONAL IMPLANTS: ICD-10-CM

## 2018-12-16 LAB
ANION GAP SERPL CALC-SCNC: 10 MMOL/L — SIGNIFICANT CHANGE UP (ref 5–17)
BUN SERPL-MCNC: 26 MG/DL — HIGH (ref 7–23)
CALCIUM SERPL-MCNC: 8.2 MG/DL — LOW (ref 8.4–10.5)
CHLORIDE SERPL-SCNC: 102 MMOL/L — SIGNIFICANT CHANGE UP (ref 96–108)
CO2 SERPL-SCNC: 24 MMOL/L — SIGNIFICANT CHANGE UP (ref 22–31)
CREAT SERPL-MCNC: 0.78 MG/DL — SIGNIFICANT CHANGE UP (ref 0.5–1.3)
GLUCOSE SERPL-MCNC: 127 MG/DL — HIGH (ref 70–99)
HCT VFR BLD CALC: 26.8 % — LOW (ref 39–50)
HGB BLD-MCNC: 9.2 G/DL — LOW (ref 13–17)
MCHC RBC-ENTMCNC: 32.2 PG — SIGNIFICANT CHANGE UP (ref 27–34)
MCHC RBC-ENTMCNC: 34.3 GM/DL — SIGNIFICANT CHANGE UP (ref 32–36)
MCV RBC AUTO: 93.8 FL — SIGNIFICANT CHANGE UP (ref 80–100)
PF4 HEPARIN CMPLX AB SER-ACNC: NEGATIVE — SIGNIFICANT CHANGE UP
PF4 HEPARIN CMPLX AB SERPL QL IA: 0.09 ABS — SIGNIFICANT CHANGE UP
PLATELET # BLD AUTO: 62 K/UL — LOW (ref 150–400)
POTASSIUM SERPL-MCNC: 4.4 MMOL/L — SIGNIFICANT CHANGE UP (ref 3.5–5.3)
POTASSIUM SERPL-SCNC: 4.4 MMOL/L — SIGNIFICANT CHANGE UP (ref 3.5–5.3)
RBC # BLD: 2.85 M/UL — LOW (ref 4.2–5.8)
RBC # FLD: 11.9 % — SIGNIFICANT CHANGE UP (ref 10.3–14.5)
SODIUM SERPL-SCNC: 136 MMOL/L — SIGNIFICANT CHANGE UP (ref 135–145)
WBC # BLD: 9.4 K/UL — SIGNIFICANT CHANGE UP (ref 3.8–10.5)
WBC # FLD AUTO: 9.4 K/UL — SIGNIFICANT CHANGE UP (ref 3.8–10.5)

## 2018-12-16 PROCEDURE — 71045 X-RAY EXAM CHEST 1 VIEW: CPT | Mod: 26

## 2018-12-16 RX ADMIN — Medication 15 MILLIGRAM(S): at 14:05

## 2018-12-16 RX ADMIN — Medication 100 MILLIGRAM(S): at 21:23

## 2018-12-16 RX ADMIN — ATORVASTATIN CALCIUM 40 MILLIGRAM(S): 80 TABLET, FILM COATED ORAL at 21:23

## 2018-12-16 RX ADMIN — OXYCODONE AND ACETAMINOPHEN 2 TABLET(S): 5; 325 TABLET ORAL at 12:35

## 2018-12-16 RX ADMIN — Medication 25 MILLIGRAM(S): at 14:05

## 2018-12-16 RX ADMIN — OXYCODONE AND ACETAMINOPHEN 2 TABLET(S): 5; 325 TABLET ORAL at 12:08

## 2018-12-16 RX ADMIN — OXYCODONE AND ACETAMINOPHEN 2 TABLET(S): 5; 325 TABLET ORAL at 04:08

## 2018-12-16 RX ADMIN — OXYCODONE AND ACETAMINOPHEN 2 TABLET(S): 5; 325 TABLET ORAL at 04:47

## 2018-12-16 RX ADMIN — Medication 15 MILLIGRAM(S): at 05:16

## 2018-12-16 RX ADMIN — FAMOTIDINE 20 MILLIGRAM(S): 10 INJECTION INTRAVENOUS at 05:03

## 2018-12-16 RX ADMIN — Medication 100 MILLIGRAM(S): at 05:03

## 2018-12-16 RX ADMIN — Medication 15 MILLIGRAM(S): at 14:20

## 2018-12-16 RX ADMIN — Medication 15 MILLIGRAM(S): at 05:04

## 2018-12-16 RX ADMIN — Medication 25 MILLIGRAM(S): at 21:23

## 2018-12-16 RX ADMIN — FAMOTIDINE 20 MILLIGRAM(S): 10 INJECTION INTRAVENOUS at 17:10

## 2018-12-16 RX ADMIN — Medication 100 MILLIGRAM(S): at 14:05

## 2018-12-16 RX ADMIN — OXYCODONE AND ACETAMINOPHEN 2 TABLET(S): 5; 325 TABLET ORAL at 22:58

## 2018-12-16 RX ADMIN — Medication 25 MILLIGRAM(S): at 05:03

## 2018-12-16 RX ADMIN — OXYCODONE AND ACETAMINOPHEN 2 TABLET(S): 5; 325 TABLET ORAL at 23:38

## 2018-12-16 NOTE — PROGRESS NOTE ADULT - SUBJECTIVE AND OBJECTIVE BOX
VITAL SIGNS-Telemetry:  SR 60-80  Vital Signs Last 24 Hrs  T(C): 36.5 (18 @ 07:24), Max: 37.3 (12-15-18 @ 23:53)  T(F): 97.7 (18 @ 07:24), Max: 99.1 (12-15-18 @ 23:53)  HR: 56 (18 @ 07:24) (56 - 78)  BP: 126/57 (18 @ 07:24) (113/60 - 129/63)  RR: 18 (18 @ 07:24) (18 - 18)  SpO2: 100% (18 @ 07:24) (98% - 100%)         12-15 @ 07:01  -   @ 07:00  --------------------------------------------------------  IN: 720 mL / OUT: 1305 mL / NET: -585 mL     @ 07:01  -   @ 10:07  --------------------------------------------------------  IN: 220 mL / OUT: 0 mL / NET: 220 mL    Daily     Daily Weight in k (16 Dec 2018 05:00)    Drains:     MS         [  ] Drainage:                 L Pleural  [  ]  Drainage:                R Pleural  [  ]  Drainage:  Pacing Wires        [  ]   Settings:                                  Isolated  [ x ]       PHYSICAL EXAM:  Neurology: alert and oriented x 3, nonfocal, no gross deficits  CV : S1S2  Sternal Wound :  CDI , Stable  Lungs:  Abdomen: soft, nontender, nondistended, positive bowel sounds, last bowel movement         Extremities:         atorvastatin 40 milliGRAM(s) Oral at bedtime  chlorhexidine 0.12% Liquid 5 milliLiter(s) Oral Mucosa every 4 hours  docusate sodium 100 milliGRAM(s) Oral three times a day  famotidine    Tablet 20 milliGRAM(s) Oral two times a day  HYDROmorphone  Injectable 0.5 milliGRAM(s) IV Push once  HYDROmorphone  Injectable 0.5 milliGRAM(s) IV Push every 3 hours PRN  ketorolac   Injectable 15 milliGRAM(s) IV Push every 8 hours  metoprolol tartrate 25 milliGRAM(s) Oral every 8 hours  ondansetron Injectable 4 milliGRAM(s) IV Push once PRN  oxyCODONE    5 mG/acetaminophen 325 mG 1 Tablet(s) Oral every 4 hours PRN  oxyCODONE    5 mG/acetaminophen 325 mG 2 Tablet(s) Oral every 6 hours PRN  potassium chloride  10 mEq/50 mL IVPB 10 milliEquivalent(s) IV Intermittent every 1 hour  potassium chloride  10 mEq/50 mL IVPB 10 milliEquivalent(s) IV Intermittent every 1 hour  potassium chloride  10 mEq/50 mL IVPB 10 milliEquivalent(s) IV Intermittent every 1 hour  sodium chloride 0.9%. 1000 milliLiter(s) IV Continuous <Continuous>      Physical Therapy Rec:   Home  [  ]   Home w/ PT  [  ]  Rehab  [  ]  Discussed with Cardiothoracic Team at AM rounds. VITAL SIGNS-Telemetry:  SR 60-80  Vital Signs Last 24 Hrs  T(C): 36.5 (18 @ 07:24), Max: 37.3 (12-15-18 @ 23:53)  T(F): 97.7 (18 @ 07:24), Max: 99.1 (12-15-18 @ 23:53)  HR: 56 (18 @ 07:24) (56 - 78)  BP: 126/57 (18 @ 07:24) (113/60 - 129/63)  RR: 18 (18 @ 07:24) (18 - 18)  SpO2: 100% (18 @ 07:24) (98% - 100%)         12-15 @ 07:01  -   @ 07:00  --------------------------------------------------------  IN: 720 mL / OUT: 1305 mL / NET: -585 mL     @ 07:01  -   @ 10:07  --------------------------------------------------------  IN: 220 mL / OUT: 0 mL / NET: 220 mL    Daily     Daily Weight in k (16 Dec 2018 05:00)    Drains:     MS  #2       [ x ] Drainage:   130cc day shift (445cc total 24hrs)                Pacing Wires        [  ]   Settings:                                  Isolated  [ x ]       PHYSICAL EXAM:  Neurology: alert and oriented x 3, nonfocal, no gross deficits  CV : S1S2  Sternal Wound :  CDI , Stable  Lungs: CTA  Abdomen: soft, nontender, nondistended, positive bowel sounds, last bowel movement   pre-op      Extremities:   no edema, no calf tenderness      atorvastatin 40 milliGRAM(s) Oral at bedtime  chlorhexidine 0.12% Liquid 5 milliLiter(s) Oral Mucosa every 4 hours  docusate sodium 100 milliGRAM(s) Oral three times a day  famotidine    Tablet 20 milliGRAM(s) Oral two times a day  HYDROmorphone  Injectable 0.5 milliGRAM(s) IV Push once  HYDROmorphone  Injectable 0.5 milliGRAM(s) IV Push every 3 hours PRN  ketorolac   Injectable 15 milliGRAM(s) IV Push every 8 hours  metoprolol tartrate 25 milliGRAM(s) Oral every 8 hours  ondansetron Injectable 4 milliGRAM(s) IV Push once PRN  oxyCODONE    5 mG/acetaminophen 325 mG 1 Tablet(s) Oral every 4 hours PRN  oxyCODONE    5 mG/acetaminophen 325 mG 2 Tablet(s) Oral every 6 hours PRN  potassium chloride  10 mEq/50 mL IVPB 10 milliEquivalent(s) IV Intermittent every 1 hour  potassium chloride  10 mEq/50 mL IVPB 10 milliEquivalent(s) IV Intermittent every 1 hour  potassium chloride  10 mEq/50 mL IVPB 10 milliEquivalent(s) IV Intermittent every 1 hour  sodium chloride 0.9%. 1000 milliLiter(s) IV Continuous <Continuous>      Physical Therapy Rec:   Home  [  ]   Home w/ PT  [  ]  Rehab  [  ]  Discussed with Cardiothoracic Team at AM rounds.

## 2018-12-16 NOTE — PROGRESS NOTE ADULT - ASSESSMENT
74 yo male, PMH HTN, with known AAA for about 2 years, reports PISANO that he first noticed last winter when shoveling snow and has become worse, recent echocardiogram on 11/15/18 revealed an increase in size on AAA to 5.25 and calcified aortic valve. Pt. presents to PST today for scheduled Hemiarch aortic Valve Replacement/  On 12/13 s/p hemiarch replaced of ascending aorta/AVR -T  Post-op Course:   HTN requiring Cardene gtt --> Weaned off on lopressor 25 mg PO TID    diurese/BB/asa/ Statin  Transferred to SDU POD #1- maintain CT's  12/15 VVS; Maintain MS CT x 2 2/2 drainage.  Toradol 30 mg IV x 1. Plts 55,000 send HIT.   12/16/18 - VSS plt count up to 62,000 HIT pending- will d/c 1 CT - last CT still with hi output @ this time.  will probably d/c tomorrow  d/c plan home 74 yo male, PMH HTN, with known AAA for about 2 years, reports PISANO that he first noticed last winter when shoveling snow and has become worse, recent echocardiogram on 11/15/18 revealed an increase in size on AAA to 5.25 and calcified aortic valve. Pt. presents to PST today for scheduled Hemiarch aortic Valve Replacement/  On 12/13 s/p hemiarch replaced of ascending aorta/AVR -T  Post-op Course:   HTN requiring Cardene gtt --> Weaned off on lopressor 25 mg PO TID    diurese/BB/asa/ Statin  Transferred to SDU POD #1- maintain CT's  12/15 VVS; Maintain MS CT x 2 2/2 drainage.  Toradol 30 mg IV x 1. Plts 55,000 send HIT.   12/16/18 - VSS plt count up to 62,000 HIT pending- will d/c 1 CT - last CT still with hi output @ this time.  will probably d/c tomorrow  d/c plan home tue/wed

## 2018-12-16 NOTE — PROGRESS NOTE ADULT - PROBLEM SELECTOR PLAN 1
Continue with Lopressor 25 mg PO TID.   Continue with Statin   Increase activity as tolerated.   Maintain MS CT x 2 --> pleural vac to LWS   Maintain PW --> EPM  Pain Management --> Toradol 15 mg IV every 8 hours x 24 hours   Encourage Chest PT / Pulmonary toileting and Incentive spirometry every 1 hour x 10 while awake.   Continue with PUD and DVT prophylaxis.   Shower on POD #5.   D/C plan home once medically cleared   Plan of care discussed with attending

## 2018-12-17 LAB
ANION GAP SERPL CALC-SCNC: 10 MMOL/L — SIGNIFICANT CHANGE UP (ref 5–17)
BUN SERPL-MCNC: 19 MG/DL — SIGNIFICANT CHANGE UP (ref 7–23)
CALCIUM SERPL-MCNC: 7.7 MG/DL — LOW (ref 8.4–10.5)
CHLORIDE SERPL-SCNC: 105 MMOL/L — SIGNIFICANT CHANGE UP (ref 96–108)
CO2 SERPL-SCNC: 24 MMOL/L — SIGNIFICANT CHANGE UP (ref 22–31)
CREAT SERPL-MCNC: 0.74 MG/DL — SIGNIFICANT CHANGE UP (ref 0.5–1.3)
GLUCOSE SERPL-MCNC: 98 MG/DL — SIGNIFICANT CHANGE UP (ref 70–99)
HCT VFR BLD CALC: 24.8 % — LOW (ref 39–50)
HGB BLD-MCNC: 8.6 G/DL — LOW (ref 13–17)
MCHC RBC-ENTMCNC: 32.7 PG — SIGNIFICANT CHANGE UP (ref 27–34)
MCHC RBC-ENTMCNC: 34.8 GM/DL — SIGNIFICANT CHANGE UP (ref 32–36)
MCV RBC AUTO: 94.2 FL — SIGNIFICANT CHANGE UP (ref 80–100)
PLATELET # BLD AUTO: 90 K/UL — LOW (ref 150–400)
POTASSIUM SERPL-MCNC: 4.3 MMOL/L — SIGNIFICANT CHANGE UP (ref 3.5–5.3)
POTASSIUM SERPL-SCNC: 4.3 MMOL/L — SIGNIFICANT CHANGE UP (ref 3.5–5.3)
RBC # BLD: 2.63 M/UL — LOW (ref 4.2–5.8)
RBC # FLD: 12.6 % — SIGNIFICANT CHANGE UP (ref 10.3–14.5)
SODIUM SERPL-SCNC: 139 MMOL/L — SIGNIFICANT CHANGE UP (ref 135–145)
WBC # BLD: 7.2 K/UL — SIGNIFICANT CHANGE UP (ref 3.8–10.5)
WBC # FLD AUTO: 7.2 K/UL — SIGNIFICANT CHANGE UP (ref 3.8–10.5)

## 2018-12-17 RX ORDER — ACETAMINOPHEN 500 MG
650 TABLET ORAL EVERY 6 HOURS
Qty: 0 | Refills: 0 | Status: DISCONTINUED | OUTPATIENT
Start: 2018-12-17 | End: 2018-12-20

## 2018-12-17 RX ORDER — HYDROMORPHONE HYDROCHLORIDE 2 MG/ML
4 INJECTION INTRAMUSCULAR; INTRAVENOUS; SUBCUTANEOUS
Qty: 0 | Refills: 0 | Status: DISCONTINUED | OUTPATIENT
Start: 2018-12-17 | End: 2018-12-20

## 2018-12-17 RX ORDER — POLYETHYLENE GLYCOL 3350 17 G/17G
17 POWDER, FOR SOLUTION ORAL DAILY
Qty: 0 | Refills: 0 | Status: DISCONTINUED | OUTPATIENT
Start: 2018-12-17 | End: 2018-12-20

## 2018-12-17 RX ORDER — HYDROMORPHONE HYDROCHLORIDE 2 MG/ML
2 INJECTION INTRAMUSCULAR; INTRAVENOUS; SUBCUTANEOUS
Qty: 0 | Refills: 0 | Status: DISCONTINUED | OUTPATIENT
Start: 2018-12-17 | End: 2018-12-20

## 2018-12-17 RX ADMIN — FAMOTIDINE 20 MILLIGRAM(S): 10 INJECTION INTRAVENOUS at 17:01

## 2018-12-17 RX ADMIN — OXYCODONE AND ACETAMINOPHEN 2 TABLET(S): 5; 325 TABLET ORAL at 07:30

## 2018-12-17 RX ADMIN — FAMOTIDINE 20 MILLIGRAM(S): 10 INJECTION INTRAVENOUS at 05:05

## 2018-12-17 RX ADMIN — HYDROMORPHONE HYDROCHLORIDE 4 MILLIGRAM(S): 2 INJECTION INTRAMUSCULAR; INTRAVENOUS; SUBCUTANEOUS at 11:07

## 2018-12-17 RX ADMIN — Medication 25 MILLIGRAM(S): at 21:30

## 2018-12-17 RX ADMIN — POLYETHYLENE GLYCOL 3350 17 GRAM(S): 17 POWDER, FOR SOLUTION ORAL at 11:11

## 2018-12-17 RX ADMIN — ATORVASTATIN CALCIUM 40 MILLIGRAM(S): 80 TABLET, FILM COATED ORAL at 21:30

## 2018-12-17 RX ADMIN — Medication 25 MILLIGRAM(S): at 14:42

## 2018-12-17 RX ADMIN — Medication 100 MILLIGRAM(S): at 05:05

## 2018-12-17 RX ADMIN — Medication 100 MILLIGRAM(S): at 21:30

## 2018-12-17 RX ADMIN — HYDROMORPHONE HYDROCHLORIDE 4 MILLIGRAM(S): 2 INJECTION INTRAMUSCULAR; INTRAVENOUS; SUBCUTANEOUS at 11:35

## 2018-12-17 RX ADMIN — Medication 100 MILLIGRAM(S): at 14:42

## 2018-12-17 RX ADMIN — HYDROMORPHONE HYDROCHLORIDE 4 MILLIGRAM(S): 2 INJECTION INTRAMUSCULAR; INTRAVENOUS; SUBCUTANEOUS at 17:01

## 2018-12-17 RX ADMIN — Medication 25 MILLIGRAM(S): at 05:05

## 2018-12-17 RX ADMIN — OXYCODONE AND ACETAMINOPHEN 2 TABLET(S): 5; 325 TABLET ORAL at 08:00

## 2018-12-17 NOTE — PROGRESS NOTE ADULT - ASSESSMENT
72 yo male PMHx HTN, AAA, recent echocardiogram on 11/15/18 revealed an increase in size on AAA to 5.25 and calcified aortic valve, presents for AAA repair and AVR. Pt sp ascending aortic aneurysm repair and aortic valve replacement with bovine pericardial valve POD # 1. Medicine following for comanagement.    # AAA repair and AVR POD #4  - appreciate great care per CTS  - pain control, bowel regimen  - statin  - R chest tube per CTS  - chest PT, ISS    # HTN  - cont BB    # thrombocytopenia  - improving, HIT sent    PMD Dr. Ren [facility]

## 2018-12-17 NOTE — PROGRESS NOTE ADULT - SUBJECTIVE AND OBJECTIVE BOX
I have pain    VITAL SIGNS    Telemetry:  nsr 50-80  Vital Signs Last 24 Hrs  T(C): 36.8 (12-17-18 @ 07:29), Max: 37.1 (12-16-18 @ 11:21)  T(F): 98.2 (12-17-18 @ 07:29), Max: 98.7 (12-16-18 @ 11:21)  HR: 81 (12-17-18 @ 10:50) (66 - 84)  BP: 154/80 (12-17-18 @ 10:49) (106/51 - 154/80)  RR: 18 (12-17-18 @ 07:29) (18 - 18)  SpO2: 92% (12-17-18 @ 10:50) (90% - 100%)                       8.6<L>                139  | 24   | 19           7.2   >-----------< 90<L>   ------------------------< 98                    24.8<L>                4.3  | 105  | 0.74                                                                      Ca 7.7<L> Mg x     Ph x        ,             9.2<L>                136  | 24   | 26<H>        9.4   >-----------< 62<L>   ------------------------< 127<H>                 26.8<L>                4.4  | 102  | 0.78                                                                      Ca 8.2<L> Mg x     Ph x                  Daily     Daily         CAPILLARY BLOOD GLUCOSE                    Coumadin    [ ] YES          [  ]      NO                                   PHYSICAL EXAM        Neurology: alert and oriented x 3, nonfocal, no gross deficits  CV : .S1S2 RRR  Sternal Wound :  CDI , Stable  Pacing Wires        [  ]   Settings:                                  Isolated  [x  ]  Lungs: bibasilar crackles   Drains:     MS         [ x ] Drainage:                 L Pleural  [  ]  Drainage:                R Pleural  [  ]  Drainage:  Abdomen: soft, nontender, nondistended, positive bowel sounds, last bowel movement Preop  :         voiding    Extremities:     neg edema - calf tenderness          acetaminophen    Suspension .. 650 milliGRAM(s) Oral every 6 hours PRN  atorvastatin 40 milliGRAM(s) Oral at bedtime  chlorhexidine 0.12% Liquid 5 milliLiter(s) Oral Mucosa every 4 hours  docusate sodium 100 milliGRAM(s) Oral three times a day  famotidine    Tablet 20 milliGRAM(s) Oral two times a day  HYDROmorphone   Tablet 2 milliGRAM(s) Oral every 3 hours PRN  HYDROmorphone   Tablet 4 milliGRAM(s) Oral every 3 hours PRN  HYDROmorphone  Injectable 0.5 milliGRAM(s) IV Push once  HYDROmorphone  Injectable 0.5 milliGRAM(s) IV Push every 3 hours PRN  metoprolol tartrate 25 milliGRAM(s) Oral every 8 hours  ondansetron Injectable 4 milliGRAM(s) IV Push once PRN  potassium chloride  10 mEq/50 mL IVPB 10 milliEquivalent(s) IV Intermittent every 1 hour  potassium chloride  10 mEq/50 mL IVPB 10 milliEquivalent(s) IV Intermittent every 1 hour  potassium chloride  10 mEq/50 mL IVPB 10 milliEquivalent(s) IV Intermittent every 1 hour  sodium chloride 0.9%. 1000 milliLiter(s) IV Continuous <Continuous>                    Physical Therapy Rec:   Home  [  ]   Home w/ PT  [  ]  Rehab  [  ]  Discussed with Cardiothoracic Team at AM rounds.

## 2018-12-17 NOTE — PROGRESS NOTE ADULT - ASSESSMENT
72 yo male, PMH HTN, with known AAA for about 2 years, reports PISANO that he first noticed last winter when shoveling snow and has become worse, recent echocardiogram on 11/15/18 revealed an increase in size on AAA to 5.25 and calcified aortic valve. Pt. presents to PST today for scheduled Hemiarch aortic Valve Replacement/  On 12/13 s/p hemiarch replaced of ascending aorta/AVR -T  Post-op Course:   HTN requiring Cardene gtt --> Weaned off on lopressor 25 mg PO TID    diurese/BB/asa/ Statin  Transferred to SDU POD #1- maintain CT's  12/15 VVS; Maintain MS CT x 2 2/2 drainage.  Toradol 30 mg IV x 1. Plts 55,000 send HIT.   12/16/18 - VSS plt count up to 62,000 HIT pending- will d/c 1 CT - last CT still with hi output @ this time.  will probably d/c tomorrow  d/c plan home tue/wed 12/17 CT drainage remains up, keep in place  May transdfer to floor  Pain management

## 2018-12-17 NOTE — PROGRESS NOTE ADULT - SUBJECTIVE AND OBJECTIVE BOX
Patient is a 73y old  Male who presents with a chief complaint of sob (16 Dec 2018 10:07)      SUBJECTIVE / OVERNIGHT EVENTS:    Patient seen and examined. co pain where chest tube inserts into chest. overall feels well. states he ate good breakfast, appetite improving. denies cp sob.       Vital Signs Last 24 Hrs  T(C): 36.8 (17 Dec 2018 07:29), Max: 36.9 (16 Dec 2018 15:00)  T(F): 98.2 (17 Dec 2018 07:29), Max: 98.5 (16 Dec 2018 15:00)  HR: 81 (17 Dec 2018 10:50) (66 - 84)  BP: 154/80 (17 Dec 2018 10:49) (106/51 - 154/80)  BP(mean): 103 (17 Dec 2018 07:29) (84 - 103)  RR: 18 (17 Dec 2018 07:29) (18 - 18)  SpO2: 92% (17 Dec 2018 10:50) (90% - 95%)  I&O's Summary    16 Dec 2018 07:01  -  17 Dec 2018 07:00  --------------------------------------------------------  IN: 460 mL / OUT: 1950 mL / NET: -1490 mL    17 Dec 2018 07:01  -  17 Dec 2018 12:36  --------------------------------------------------------  IN: 0 mL / OUT: 140 mL / NET: -140 mL        PE:  GENERAL: NAD, AAOx3  HEAD:  Atraumatic, Normocephalic  EYES: EOMI, PERRLA, conjunctiva and sclera clear  NECK: Supple, No JVD  CHEST/LUNG: CTABL, No wheeze, midline incision dressed, right chest tube dressed  HEART: Regular rate and rhythm; + murmur  ABDOMEN: Soft, Nontender, Nondistended; Bowel sounds present  EXTREMITIES:  2+ Peripheral Pulses, +1 le edema  SKIN: No rashes or lesions  NEURO: No focal deficits    LABS:                        8.6    7.2   )-----------( 90       ( 17 Dec 2018 05:38 )             24.8     12-17    139  |  105  |  19  ----------------------------<  98  4.3   |  24  |  0.74    Ca    7.7<L>      17 Dec 2018 05:38        CAPILLARY BLOOD GLUCOSE                RADIOLOGY & ADDITIONAL TESTS:    Imaging Personally Reviewed:  [x] YES  [ ] NO    Consultant(s) Notes Reviewed:  [x] YES  [ ] NO    MEDICATIONS  (STANDING):  atorvastatin 40 milliGRAM(s) Oral at bedtime  chlorhexidine 0.12% Liquid 5 milliLiter(s) Oral Mucosa every 4 hours  docusate sodium 100 milliGRAM(s) Oral three times a day  famotidine    Tablet 20 milliGRAM(s) Oral two times a day  HYDROmorphone  Injectable 0.5 milliGRAM(s) IV Push once  metoprolol tartrate 25 milliGRAM(s) Oral every 8 hours  polyethylene glycol 3350 17 Gram(s) Oral daily  potassium chloride  10 mEq/50 mL IVPB 10 milliEquivalent(s) IV Intermittent every 1 hour  potassium chloride  10 mEq/50 mL IVPB 10 milliEquivalent(s) IV Intermittent every 1 hour  potassium chloride  10 mEq/50 mL IVPB 10 milliEquivalent(s) IV Intermittent every 1 hour  sodium chloride 0.9%. 1000 milliLiter(s) (10 mL/Hr) IV Continuous <Continuous>    MEDICATIONS  (PRN):  acetaminophen    Suspension .. 650 milliGRAM(s) Oral every 6 hours PRN Mild Pain (1 - 3)  HYDROmorphone   Tablet 2 milliGRAM(s) Oral every 3 hours PRN Moderate Pain (4 - 6)  HYDROmorphone   Tablet 4 milliGRAM(s) Oral every 3 hours PRN Severe Pain (7 - 10)  HYDROmorphone  Injectable 0.5 milliGRAM(s) IV Push every 3 hours PRN Severe Pain (7 - 10)  ondansetron Injectable 4 milliGRAM(s) IV Push once PRN Nausea and/or Vomiting      Care Discussed with Consultants/Other Providers [x] YES  [ ] NO    HEALTH ISSUES - PROBLEM Dx:  Chest tube in place: Chest tube in place  Thrombocytopenia: Thrombocytopenia  S/P aortic aneurysm repair: S/P aortic aneurysm repair  Prophylactic measure: Prophylactic measure  KRISTINE (obstructive sleep apnea): KRISTINE (obstructive sleep apnea)  HTN (hypertension): HTN (hypertension)  Ascending aortic aneurysm: Ascending aortic aneurysm

## 2018-12-18 LAB
ANION GAP SERPL CALC-SCNC: 9 MMOL/L — SIGNIFICANT CHANGE UP (ref 5–17)
BASOPHILS # BLD AUTO: 0 K/UL — SIGNIFICANT CHANGE UP (ref 0–0.2)
BASOPHILS NFR BLD AUTO: 0.3 % — SIGNIFICANT CHANGE UP (ref 0–2)
BUN SERPL-MCNC: 16 MG/DL — SIGNIFICANT CHANGE UP (ref 7–23)
CALCIUM SERPL-MCNC: 8 MG/DL — LOW (ref 8.4–10.5)
CHLORIDE SERPL-SCNC: 104 MMOL/L — SIGNIFICANT CHANGE UP (ref 96–108)
CO2 SERPL-SCNC: 24 MMOL/L — SIGNIFICANT CHANGE UP (ref 22–31)
CREAT SERPL-MCNC: 0.74 MG/DL — SIGNIFICANT CHANGE UP (ref 0.5–1.3)
EOSINOPHIL # BLD AUTO: 0.3 K/UL — SIGNIFICANT CHANGE UP (ref 0–0.5)
EOSINOPHIL NFR BLD AUTO: 4.6 % — SIGNIFICANT CHANGE UP (ref 0–6)
GLUCOSE SERPL-MCNC: 98 MG/DL — SIGNIFICANT CHANGE UP (ref 70–99)
HCT VFR BLD CALC: 27.1 % — LOW (ref 39–50)
HGB BLD-MCNC: 9.3 G/DL — LOW (ref 13–17)
LYMPHOCYTES # BLD AUTO: 1.4 K/UL — SIGNIFICANT CHANGE UP (ref 1–3.3)
LYMPHOCYTES # BLD AUTO: 20.4 % — SIGNIFICANT CHANGE UP (ref 13–44)
MCHC RBC-ENTMCNC: 32.2 PG — SIGNIFICANT CHANGE UP (ref 27–34)
MCHC RBC-ENTMCNC: 34.3 GM/DL — SIGNIFICANT CHANGE UP (ref 32–36)
MCV RBC AUTO: 94 FL — SIGNIFICANT CHANGE UP (ref 80–100)
MONOCYTES # BLD AUTO: 1 K/UL — HIGH (ref 0–0.9)
MONOCYTES NFR BLD AUTO: 14 % — SIGNIFICANT CHANGE UP (ref 2–14)
NEUTROPHILS # BLD AUTO: 4.3 K/UL — SIGNIFICANT CHANGE UP (ref 1.8–7.4)
NEUTROPHILS NFR BLD AUTO: 60.6 % — SIGNIFICANT CHANGE UP (ref 43–77)
PLATELET # BLD AUTO: 117 K/UL — LOW (ref 150–400)
POTASSIUM SERPL-MCNC: 4.6 MMOL/L — SIGNIFICANT CHANGE UP (ref 3.5–5.3)
POTASSIUM SERPL-SCNC: 4.6 MMOL/L — SIGNIFICANT CHANGE UP (ref 3.5–5.3)
RBC # BLD: 2.89 M/UL — LOW (ref 4.2–5.8)
RBC # FLD: 12 % — SIGNIFICANT CHANGE UP (ref 10.3–14.5)
SODIUM SERPL-SCNC: 137 MMOL/L — SIGNIFICANT CHANGE UP (ref 135–145)
WBC # BLD: 7 K/UL — SIGNIFICANT CHANGE UP (ref 3.8–10.5)
WBC # FLD AUTO: 7 K/UL — SIGNIFICANT CHANGE UP (ref 3.8–10.5)

## 2018-12-18 PROCEDURE — 71045 X-RAY EXAM CHEST 1 VIEW: CPT | Mod: 26

## 2018-12-18 RX ORDER — METOPROLOL TARTRATE 50 MG
25 TABLET ORAL ONCE
Qty: 0 | Refills: 0 | Status: COMPLETED | OUTPATIENT
Start: 2018-12-18 | End: 2018-12-18

## 2018-12-18 RX ORDER — ASPIRIN/CALCIUM CARB/MAGNESIUM 324 MG
81 TABLET ORAL DAILY
Qty: 0 | Refills: 0 | Status: DISCONTINUED | OUTPATIENT
Start: 2018-12-18 | End: 2018-12-20

## 2018-12-18 RX ORDER — ENOXAPARIN SODIUM 100 MG/ML
40 INJECTION SUBCUTANEOUS DAILY
Qty: 0 | Refills: 0 | Status: DISCONTINUED | OUTPATIENT
Start: 2018-12-18 | End: 2018-12-20

## 2018-12-18 RX ORDER — FUROSEMIDE 40 MG
40 TABLET ORAL DAILY
Qty: 0 | Refills: 0 | Status: DISCONTINUED | OUTPATIENT
Start: 2018-12-18 | End: 2018-12-20

## 2018-12-18 RX ORDER — SPIRONOLACTONE 25 MG/1
25 TABLET, FILM COATED ORAL DAILY
Qty: 0 | Refills: 0 | Status: DISCONTINUED | OUTPATIENT
Start: 2018-12-18 | End: 2018-12-20

## 2018-12-18 RX ORDER — METOPROLOL TARTRATE 50 MG
50 TABLET ORAL
Qty: 0 | Refills: 0 | Status: DISCONTINUED | OUTPATIENT
Start: 2018-12-18 | End: 2018-12-20

## 2018-12-18 RX ORDER — COLCHICINE 0.6 MG
0.6 TABLET ORAL
Qty: 0 | Refills: 0 | Status: DISCONTINUED | OUTPATIENT
Start: 2018-12-18 | End: 2018-12-20

## 2018-12-18 RX ADMIN — POLYETHYLENE GLYCOL 3350 17 GRAM(S): 17 POWDER, FOR SOLUTION ORAL at 10:49

## 2018-12-18 RX ADMIN — HYDROMORPHONE HYDROCHLORIDE 4 MILLIGRAM(S): 2 INJECTION INTRAMUSCULAR; INTRAVENOUS; SUBCUTANEOUS at 02:02

## 2018-12-18 RX ADMIN — HYDROMORPHONE HYDROCHLORIDE 4 MILLIGRAM(S): 2 INJECTION INTRAMUSCULAR; INTRAVENOUS; SUBCUTANEOUS at 02:45

## 2018-12-18 RX ADMIN — HYDROMORPHONE HYDROCHLORIDE 4 MILLIGRAM(S): 2 INJECTION INTRAMUSCULAR; INTRAVENOUS; SUBCUTANEOUS at 21:37

## 2018-12-18 RX ADMIN — HYDROMORPHONE HYDROCHLORIDE 4 MILLIGRAM(S): 2 INJECTION INTRAMUSCULAR; INTRAVENOUS; SUBCUTANEOUS at 09:39

## 2018-12-18 RX ADMIN — Medication 81 MILLIGRAM(S): at 18:07

## 2018-12-18 RX ADMIN — HYDROMORPHONE HYDROCHLORIDE 4 MILLIGRAM(S): 2 INJECTION INTRAMUSCULAR; INTRAVENOUS; SUBCUTANEOUS at 22:12

## 2018-12-18 RX ADMIN — Medication 25 MILLIGRAM(S): at 08:06

## 2018-12-18 RX ADMIN — FAMOTIDINE 20 MILLIGRAM(S): 10 INJECTION INTRAVENOUS at 05:32

## 2018-12-18 RX ADMIN — Medication 50 MILLIGRAM(S): at 18:07

## 2018-12-18 RX ADMIN — CHLORHEXIDINE GLUCONATE 5 MILLILITER(S): 213 SOLUTION TOPICAL at 21:37

## 2018-12-18 RX ADMIN — Medication 100 MILLIGRAM(S): at 13:20

## 2018-12-18 RX ADMIN — Medication 0.6 MILLIGRAM(S): at 18:07

## 2018-12-18 RX ADMIN — Medication 25 MILLIGRAM(S): at 05:32

## 2018-12-18 RX ADMIN — ATORVASTATIN CALCIUM 40 MILLIGRAM(S): 80 TABLET, FILM COATED ORAL at 21:37

## 2018-12-18 RX ADMIN — HYDROMORPHONE HYDROCHLORIDE 4 MILLIGRAM(S): 2 INJECTION INTRAMUSCULAR; INTRAVENOUS; SUBCUTANEOUS at 08:06

## 2018-12-18 RX ADMIN — Medication 100 MILLIGRAM(S): at 21:37

## 2018-12-18 RX ADMIN — SPIRONOLACTONE 25 MILLIGRAM(S): 25 TABLET, FILM COATED ORAL at 09:43

## 2018-12-18 RX ADMIN — ENOXAPARIN SODIUM 40 MILLIGRAM(S): 100 INJECTION SUBCUTANEOUS at 10:49

## 2018-12-18 RX ADMIN — Medication 100 MILLIGRAM(S): at 05:32

## 2018-12-18 RX ADMIN — Medication 40 MILLIGRAM(S): at 09:43

## 2018-12-18 RX ADMIN — FAMOTIDINE 20 MILLIGRAM(S): 10 INJECTION INTRAVENOUS at 18:05

## 2018-12-18 NOTE — PROGRESS NOTE ADULT - SUBJECTIVE AND OBJECTIVE BOX
hello    VITAL SIGNS    Telemetry:  nsr 70  Vital Signs Last 24 Hrs  T(C): 37.1 (12-18-18 @ 03:00), Max: 37.1 (12-18-18 @ 03:00)  T(F): 98.8 (12-18-18 @ 03:00), Max: 98.8 (12-18-18 @ 03:00)  HR: 70 (12-18-18 @ 07:28) (70 - 88)  BP: 145/69 (12-18-18 @ 07:28) (144/70 - 154/80)  RR: 18 (12-18-18 @ 07:28) (18 - 18)  SpO2: 94% (12-18-18 @ 07:28) (92% - 97%)                       9.3<L>                137  | 24   | 16           7.0   >-----------< 117<L>   ------------------------< 98                    27.1<L>                4.6  | 104  | 0.74                                                                      Ca 8.0<L> Mg x     Ph x        ,             8.6<L>                139  | 24   | 19           7.2   >-----------< 90<L>   ------------------------< 98                    24.8<L>                4.3  | 105  | 0.74                                                                      Ca 7.7<L> Mg x     Ph x                  Daily     Daily         CAPILLARY BLOOD GLUCOSE      POCT Blood Glucose.: 105 mg/dL (17 Dec 2018 16:41)                Coumadin    [ ] YES          [ x ]      NO                                   PHYSICAL EXAM        Neurology: alert and oriented x 3, nonfocal, no gross deficits  CV : .S1S2 RRR  Sternal Wound :  CDI , Stable  Pacing Wires        [  ]   Settings:                                  Isolated  [x  ]  Lungs: bibasilar crackles   Drains:     MS         [ x ] Drainage:        250         L Pleural  [  ]  Drainage:                R Pleural  [  ]  Drainage:  Abdomen: soft, nontender, nondistended, positive bowel sounds, last bowel movement -  :         voiding    Extremities:     trace edema - calve tenderness          acetaminophen    Suspension .. 650 milliGRAM(s) Oral every 6 hours PRN  atorvastatin 40 milliGRAM(s) Oral at bedtime  chlorhexidine 0.12% Liquid 5 milliLiter(s) Oral Mucosa every 4 hours  colchicine 0.6 milliGRAM(s) Oral two times a day  docusate sodium 100 milliGRAM(s) Oral three times a day  enoxaparin Injectable 40 milliGRAM(s) SubCutaneous daily  famotidine    Tablet 20 milliGRAM(s) Oral two times a day  furosemide    Tablet 40 milliGRAM(s) Oral daily  HYDROmorphone   Tablet 2 milliGRAM(s) Oral every 3 hours PRN  HYDROmorphone   Tablet 4 milliGRAM(s) Oral every 3 hours PRN  HYDROmorphone  Injectable 0.5 milliGRAM(s) IV Push once  HYDROmorphone  Injectable 0.5 milliGRAM(s) IV Push every 3 hours PRN  metoprolol tartrate 50 milliGRAM(s) Oral two times a day  ondansetron Injectable 4 milliGRAM(s) IV Push once PRN  polyethylene glycol 3350 17 Gram(s) Oral daily  potassium chloride  10 mEq/50 mL IVPB 10 milliEquivalent(s) IV Intermittent every 1 hour  potassium chloride  10 mEq/50 mL IVPB 10 milliEquivalent(s) IV Intermittent every 1 hour  potassium chloride  10 mEq/50 mL IVPB 10 milliEquivalent(s) IV Intermittent every 1 hour  sodium chloride 0.9%. 1000 milliLiter(s) IV Continuous <Continuous>  spironolactone 25 milliGRAM(s) Oral daily                    Physical Therapy Rec:   Home  [  ]   Home w/ PT  [  ]  Rehab  [  ]  Discussed with Cardiothoracic Team at AM rounds.

## 2018-12-18 NOTE — PROGRESS NOTE ADULT - PROBLEM SELECTOR PLAN 1
Continue with Lopressor 50bid  Continue with Statin   Increase activity as tolerated.   Maintain MS CT x 2 --> pleural vac to LWS   Maintain PW --> EPM  Pain Management --> Toradol 15 mg IV every 8 hours x 24 hours   Encourage Chest PT / Pulmonary toileting and Incentive spirometry every 1 hour x 10 while awake.   Continue with PUD and DVT prophylaxis.   Shower on POD #5.   D/C plan home once medically cleared   Plan of care discussed with attending

## 2018-12-18 NOTE — PROGRESS NOTE ADULT - ASSESSMENT
72 yo male, PMH HTN, with known AAA for about 2 years, reports PISANO that he first noticed last winter when shoveling snow and has become worse, recent echocardiogram on 11/15/18 revealed an increase in size on AAA to 5.25 and calcified aortic valve. Pt. presents to PST today for scheduled Hemiarch aortic Valve Replacement/  On 12/13 s/p hemiarch replaced of ascending aorta/AVR -T  Post-op Course:   HTN requiring Cardene gtt --> Weaned off on lopressor 25 mg PO TID    diurese/BB/asa/ Statin  Transferred to SDU POD #1- maintain CT's  12/15 VVS; Maintain MS CT x 2 2/2 drainage.  Toradol 30 mg IV x 1. Plts 55,000 send HIT.   12/16/18 - VSS plt count up to 62,000 HIT pending- will d/c 1 CT - last CT still with hi output @ this time.  will probably d/c tomorrow  d/c plan home tue/wed 12/17 CT drainage remains up, keep in place  May transdfer to floor  Pain management  12/18 ct 250cc 24hr, lasix/aldactone/colchicine started, ct remains in.  Lopressor increased for bp/hr control

## 2018-12-19 LAB
ANION GAP SERPL CALC-SCNC: 13 MMOL/L — SIGNIFICANT CHANGE UP (ref 5–17)
BASOPHILS # BLD AUTO: 0 K/UL — SIGNIFICANT CHANGE UP (ref 0–0.2)
BASOPHILS NFR BLD AUTO: 0.2 % — SIGNIFICANT CHANGE UP (ref 0–2)
BUN SERPL-MCNC: 17 MG/DL — SIGNIFICANT CHANGE UP (ref 7–23)
CALCIUM SERPL-MCNC: 8.5 MG/DL — SIGNIFICANT CHANGE UP (ref 8.4–10.5)
CHLORIDE SERPL-SCNC: 100 MMOL/L — SIGNIFICANT CHANGE UP (ref 96–108)
CO2 SERPL-SCNC: 25 MMOL/L — SIGNIFICANT CHANGE UP (ref 22–31)
CREAT SERPL-MCNC: 0.81 MG/DL — SIGNIFICANT CHANGE UP (ref 0.5–1.3)
EOSINOPHIL # BLD AUTO: 0.4 K/UL — SIGNIFICANT CHANGE UP (ref 0–0.5)
EOSINOPHIL NFR BLD AUTO: 4 % — SIGNIFICANT CHANGE UP (ref 0–6)
GLUCOSE SERPL-MCNC: 127 MG/DL — HIGH (ref 70–99)
HCT VFR BLD CALC: 32 % — LOW (ref 39–50)
HGB BLD-MCNC: 10.1 G/DL — LOW (ref 13–17)
LYMPHOCYTES # BLD AUTO: 0.9 K/UL — LOW (ref 1–3.3)
LYMPHOCYTES # BLD AUTO: 10.5 % — LOW (ref 13–44)
MCHC RBC-ENTMCNC: 29.5 PG — SIGNIFICANT CHANGE UP (ref 27–34)
MCHC RBC-ENTMCNC: 31.5 GM/DL — LOW (ref 32–36)
MCV RBC AUTO: 93.4 FL — SIGNIFICANT CHANGE UP (ref 80–100)
MONOCYTES # BLD AUTO: 1 K/UL — HIGH (ref 0–0.9)
MONOCYTES NFR BLD AUTO: 11.9 % — SIGNIFICANT CHANGE UP (ref 2–14)
NEUTROPHILS # BLD AUTO: 6.5 K/UL — SIGNIFICANT CHANGE UP (ref 1.8–7.4)
NEUTROPHILS NFR BLD AUTO: 73.5 % — SIGNIFICANT CHANGE UP (ref 43–77)
PLATELET # BLD AUTO: 179 K/UL — SIGNIFICANT CHANGE UP (ref 150–400)
POTASSIUM SERPL-MCNC: 4 MMOL/L — SIGNIFICANT CHANGE UP (ref 3.5–5.3)
POTASSIUM SERPL-SCNC: 4 MMOL/L — SIGNIFICANT CHANGE UP (ref 3.5–5.3)
RBC # BLD: 3.42 M/UL — LOW (ref 4.2–5.8)
RBC # FLD: 12 % — SIGNIFICANT CHANGE UP (ref 10.3–14.5)
SODIUM SERPL-SCNC: 138 MMOL/L — SIGNIFICANT CHANGE UP (ref 135–145)
WBC # BLD: 8.8 K/UL — SIGNIFICANT CHANGE UP (ref 3.8–10.5)
WBC # FLD AUTO: 8.8 K/UL — SIGNIFICANT CHANGE UP (ref 3.8–10.5)

## 2018-12-19 RX ORDER — SORBITOL SOLUTION 70 %
30 SOLUTION, ORAL MISCELLANEOUS ONCE
Qty: 0 | Refills: 0 | Status: COMPLETED | OUTPATIENT
Start: 2018-12-19 | End: 2018-12-19

## 2018-12-19 RX ADMIN — CHLORHEXIDINE GLUCONATE 5 MILLILITER(S): 213 SOLUTION TOPICAL at 05:31

## 2018-12-19 RX ADMIN — Medication 50 MILLIGRAM(S): at 05:31

## 2018-12-19 RX ADMIN — HYDROMORPHONE HYDROCHLORIDE 4 MILLIGRAM(S): 2 INJECTION INTRAMUSCULAR; INTRAVENOUS; SUBCUTANEOUS at 14:10

## 2018-12-19 RX ADMIN — HYDROMORPHONE HYDROCHLORIDE 4 MILLIGRAM(S): 2 INJECTION INTRAMUSCULAR; INTRAVENOUS; SUBCUTANEOUS at 17:45

## 2018-12-19 RX ADMIN — HYDROMORPHONE HYDROCHLORIDE 4 MILLIGRAM(S): 2 INJECTION INTRAMUSCULAR; INTRAVENOUS; SUBCUTANEOUS at 05:31

## 2018-12-19 RX ADMIN — ENOXAPARIN SODIUM 40 MILLIGRAM(S): 100 INJECTION SUBCUTANEOUS at 12:14

## 2018-12-19 RX ADMIN — Medication 100 MILLIGRAM(S): at 05:32

## 2018-12-19 RX ADMIN — Medication 0.6 MILLIGRAM(S): at 17:44

## 2018-12-19 RX ADMIN — HYDROMORPHONE HYDROCHLORIDE 4 MILLIGRAM(S): 2 INJECTION INTRAMUSCULAR; INTRAVENOUS; SUBCUTANEOUS at 06:06

## 2018-12-19 RX ADMIN — CHLORHEXIDINE GLUCONATE 5 MILLILITER(S): 213 SOLUTION TOPICAL at 17:49

## 2018-12-19 RX ADMIN — Medication 30 MILLILITER(S): at 12:13

## 2018-12-19 RX ADMIN — FAMOTIDINE 20 MILLIGRAM(S): 10 INJECTION INTRAVENOUS at 05:37

## 2018-12-19 RX ADMIN — SPIRONOLACTONE 25 MILLIGRAM(S): 25 TABLET, FILM COATED ORAL at 08:16

## 2018-12-19 RX ADMIN — CHLORHEXIDINE GLUCONATE 5 MILLILITER(S): 213 SOLUTION TOPICAL at 03:57

## 2018-12-19 RX ADMIN — Medication 81 MILLIGRAM(S): at 12:14

## 2018-12-19 RX ADMIN — HYDROMORPHONE HYDROCHLORIDE 4 MILLIGRAM(S): 2 INJECTION INTRAMUSCULAR; INTRAVENOUS; SUBCUTANEOUS at 18:30

## 2018-12-19 RX ADMIN — Medication 0.6 MILLIGRAM(S): at 05:31

## 2018-12-19 RX ADMIN — HYDROMORPHONE HYDROCHLORIDE 4 MILLIGRAM(S): 2 INJECTION INTRAMUSCULAR; INTRAVENOUS; SUBCUTANEOUS at 15:30

## 2018-12-19 RX ADMIN — Medication 50 MILLIGRAM(S): at 17:28

## 2018-12-19 RX ADMIN — ATORVASTATIN CALCIUM 40 MILLIGRAM(S): 80 TABLET, FILM COATED ORAL at 21:27

## 2018-12-19 RX ADMIN — Medication 100 MILLIGRAM(S): at 13:24

## 2018-12-19 RX ADMIN — CHLORHEXIDINE GLUCONATE 5 MILLILITER(S): 213 SOLUTION TOPICAL at 13:24

## 2018-12-19 RX ADMIN — Medication 40 MILLIGRAM(S): at 05:31

## 2018-12-19 RX ADMIN — FAMOTIDINE 20 MILLIGRAM(S): 10 INJECTION INTRAVENOUS at 17:28

## 2018-12-19 RX ADMIN — Medication 100 MILLIGRAM(S): at 21:28

## 2018-12-19 RX ADMIN — CHLORHEXIDINE GLUCONATE 5 MILLILITER(S): 213 SOLUTION TOPICAL at 21:28

## 2018-12-19 NOTE — PROGRESS NOTE ADULT - ASSESSMENT
72 yo male, PMH HTN, with known AAA for about 2 years, reports PISANO that he first noticed last winter when shoveling snow and has become worse, recent echocardiogram on 11/15/18 revealed an increase in size on AAA to 5.25 and calcified aortic valve. Pt. presents to PST today for scheduled Hemiarch aortic Valve Replacement/  On 12/13 s/p hemiarch replaced of ascending aorta/AVR -T  Post-op Course:   HTN requiring Cardene gtt --> Weaned off on lopressor 25 mg PO TID    diurese/BB/asa/ Statin  Transferred to SDU POD #1- maintain CT's  12/15 VVS; Maintain MS CT x 2 2/2 drainage.  Toradol 30 mg IV x 1. Plts 55,000 send HIT.   12/16/18 - VSS plt count up to 62,000 HIT pending- will d/c 1 CT - last CT still with hi output @ this time.  will probably d/c tomorrow  d/c plan home tue/wed 12/17 CT drainage remains up, keep in place  May transdfer to floor  Pain management  12/18 ct 250cc 24hr, lasix/aldactone/colchicine started, ct remains in.  Lopressor increased for bp/hr control  12/19 VSS, Mediastinal CT remains, 170 ml per 24 hours, will d/c later if output <60 ml.  today.

## 2018-12-19 NOTE — PROGRESS NOTE ADULT - PROVIDER SPECIALTY LIST ADULT
CT Surgery
Internal Medicine
Internal Medicine
CT Surgery
CTU
CT Surgery

## 2018-12-19 NOTE — PROGRESS NOTE ADULT - PROBLEM SELECTOR PLAN 4
med. CT #2 in place  will d/c CT in am if output less
med. CT #2 in place  will d/c CT in am if output less
med. CT #2 in place  will d/c CT in am if output less than 60 ml
med. CT #2 in place  will d/c CT in am if output less

## 2018-12-19 NOTE — PROGRESS NOTE ADULT - PROBLEM SELECTOR PLAN 3
gi & dvt prophylaxis

## 2018-12-19 NOTE — PROGRESS NOTE ADULT - SUBJECTIVE AND OBJECTIVE BOX
Interval Hx; Events Overnight:  no events overnight, pt oob ambulating.     LABS:                10.1                 138  | 25   | 17           8.8   >-----------< 179     ------------------------< 127                   32.0                 4.0  | 100  | 0.81                                         Ca 8.5   Mg x     Ph x              VITAL SIGNS    Telemetry: NSR 70-90s     Daily     Daily Weight in k.8 (19 Dec 2018 08:39)      Vital Signs Last 24 Hrs  T(C): 37.1 (18 @ 05:30), Max: 37.5 (18 @ 20:00)  T(F): 98.7 (18 @ 05:30), Max: 99.5 (18 @ 20:00)  HR: 82 (18 @ 05:30) (76 - 87)  BP: 107/62 (18 @ 05:30) (107/62 - 128/73)  RR: 18 (18 @ 05:30) (18 - 18)  SpO2: 95% (18 @ 05:30) (94% - 96%)             I&O's Detail    18 Dec 2018 07:  -  19 Dec 2018 07:00  --------------------------------------------------------  IN:    Oral Fluid: 480 mL  Total IN: 480 mL    OUT:    Chest Tube: 170 mL    Voided: 2500 mL  Total OUT: 2670 mL    Total NET: -2190 mL      19 Dec 2018 07:  -  19 Dec 2018 10:57  --------------------------------------------------------  IN:    Oral Fluid: 240 mL  Total IN: 240 mL    OUT:    Voided: 700 mL  Total OUT: 700 mL    Total NET: -460 mL                    GLUCOSE  CAPILLARY BLOOD GLUCOSE                Tubes/Lines/Drains  CENTRAL LINE: [ ] YES [x ] NO  LOCATION:      CHEST TUBES:     Meds : [ x ] 170 ml              PACER WIRE: YES [x ] NO [ ]  Setting: PWx2 isolated    EVERETT: [ ] YES [ x] NO          A-LINE:  [ ] YES [x ] NO  LOCATION:  R/L RADIAL                        PHYSICAL EXAM      General: NAD, well appearing, in no distress  Neurology: A&O x3, non focal, no neuro deficits. Moves all extremities to command.  CV : s1 s2 RRR, no murmurs, gallops, clicks.   Sternal Wound :  Midsternal wound, clean, dry and intact, open to air  Lungs: clear to auscultation  Abdomen: soft, nontender, nondistended, positive bowel sounds, +flatus, no BM, sorbitol ordered  :    voiding /    Extremities:    no  edema. + pedal pulses      Incision: intact  Skin: intact, no lesions        MEDICATIONS  acetaminophen    Suspension .. 650 milliGRAM(s) Oral every 6 hours PRN  aspirin enteric coated 81 milliGRAM(s) Oral daily  atorvastatin 40 milliGRAM(s) Oral at bedtime  chlorhexidine 0.12% Liquid 5 milliLiter(s) Oral Mucosa every 4 hours  colchicine 0.6 milliGRAM(s) Oral two times a day  docusate sodium 100 milliGRAM(s) Oral three times a day  enoxaparin Injectable 40 milliGRAM(s) SubCutaneous daily  famotidine    Tablet 20 milliGRAM(s) Oral two times a day  furosemide    Tablet 40 milliGRAM(s) Oral daily  HYDROmorphone   Tablet 2 milliGRAM(s) Oral every 3 hours PRN  HYDROmorphone   Tablet 4 milliGRAM(s) Oral every 3 hours PRN  HYDROmorphone  Injectable 0.5 milliGRAM(s) IV Push once  HYDROmorphone  Injectable 0.5 milliGRAM(s) IV Push every 3 hours PRN  metoprolol tartrate 50 milliGRAM(s) Oral two times a day  ondansetron Injectable 4 milliGRAM(s) IV Push once PRN  polyethylene glycol 3350 17 Gram(s) Oral daily  potassium chloride  10 mEq/50 mL IVPB 10 milliEquivalent(s) IV Intermittent every 1 hour  potassium chloride  10 mEq/50 mL IVPB 10 milliEquivalent(s) IV Intermittent every 1 hour  potassium chloride  10 mEq/50 mL IVPB 10 milliEquivalent(s) IV Intermittent every 1 hour  sodium chloride 0.9%. 1000 milliLiter(s) IV Continuous <Continuous>  sorbitol 70% Solution 30 milliLiter(s) Oral once  spironolactone 25 milliGRAM(s) Oral daily

## 2018-12-19 NOTE — PROGRESS NOTE ADULT - PROBLEM SELECTOR PLAN 1
Continue with Lopressor 50bid  Continue with Statin   Increase activity as tolerated.   Maintain MS CT x 1 --> pleural vac to LWS, will d/c once ouput <60ml  Maintain PW --> isolated  Encourage Chest PT / Pulmonary toileting and Incentive spirometry every 1 hour x 10 while awake.   Continue with PUD and DVT prophylaxis.   Shower on POD #5.   D/C plan home once medically cleared   Plan of care discussed with attending

## 2018-12-19 NOTE — DIETITIAN INITIAL EVALUATION ADULT. - ADHERENCE
n/a/Pt reports healthful diet, however suspect portion control may be an issue.  Pt denies having being given any diet education previously.

## 2018-12-19 NOTE — DIETITIAN INITIAL EVALUATION ADULT. - ORAL INTAKE PTA
Pt reports eating well PTA,  Mostly cooks for himself, does not follow any diet at home, but generally tries to eat healthfully. A typical breakfast is bagel and cream cheese+ 2 cups of coffee with half and half, lunch is his largest meals of the day, consisting of roasted chicken and vegetables/salad with dressing and dinner is leftovers from lunch.  Pt will occasionaly eat fast food burgers, about once every 2 months, and will also occasionally have boxed/canned soup. Pt states that he does not like sugar.  In recent years he has cut back on his soda consumption, now sipping 2-3 oz/per day. NKFA. Pt takes Vitamin D3 and glucosamine at home (both Dr prescribed)/good

## 2018-12-19 NOTE — DIETITIAN INITIAL EVALUATION ADULT. - NS AS NUTRI INTERV MEALS SNACK
consider changing diet order to DASH TLC diet to help control fat and sodium intake/Mineral - modified diet/Fat - modified diet Mineral - modified diet/continue with regular diet, however add sodium restriction to reduce salt intake/General/healthful diet

## 2018-12-19 NOTE — DIETITIAN INITIAL EVALUATION ADULT. - OTHER INFO
Pt seen as length of stay on 2CO[facility].  Pt reports he had a very poor appetite following his procedure on 12/13, then eating about 10% of his meals, however since 12/16 his appetite is back and now reports good appetite and completing 100% of meals for the past 3 days.  Diet order is regular, no restrictions.  Pt is enjoying his meals, choosing molina, half and half and other foods high in fat. No c/o nausea, constipation, diarrhea, vomiting, difficulty chewing/swallowing. Pt's last BM was on 12/12 and he is currently on a bowel regimen, however he states no discomfort. Pt had no previous diet education on following a heart healthy/low sodium diet and was receptive to diet education on these topics at this time.

## 2018-12-19 NOTE — DIETITIAN INITIAL EVALUATION ADULT. - ENERGY NEEDS
Ht.   68"  , Wt.  110  Kg, BMI (using wt upon admission)  36.1 kg/m2,  IBW  70 Kg,    Pt is at   157 %  IBW.   1+ Edema on L&R foot noted, no pressure injuries noted per nursing flowsheet.  Defer fluids to medical team.  Pt denies recent wt change, states UBW is 235 lb.  Admission wt (12/13) 108 kg (12/16) 110 kg.  Wt gain likely due to fluid shifts. Pt admitted with SOB and PISANO.  S/p AVD and aortic aneurism repair (12/13).  PMH of HTN, thrombocytopenia, AAA, echocardiogram (11/15/18), AVD and KRISTINE. Ht.   68"  , Wt.  110  Kg, BMI (using wt upon admission)  36.1 kg/m2,  IBW  70 Kg,    Pt is at   157 %  IBW.   1+ Edema on L&R foot noted, no pressure injuries noted per nursing flowsheet.  Defer fluids to medical team.  Pt denies recent wt change, states UBW is 235 lb.  Admission wt (12/13) 108 kg (12/16) 110 kg.  Wt gain likely due to fluid shifts. Pt admitted with SOB and PISANO.  S/p AVD and aortic aneurism repair (12/13).  PMH of HTN, thrombocytopenia, AAA, echocardiogram (11/15/18), AVR and KRISTINE.

## 2018-12-20 ENCOUNTER — TRANSCRIPTION ENCOUNTER (OUTPATIENT)
Age: 73
End: 2018-12-20

## 2018-12-20 VITALS
SYSTOLIC BLOOD PRESSURE: 138 MMHG | DIASTOLIC BLOOD PRESSURE: 70 MMHG | RESPIRATION RATE: 18 BRPM | HEART RATE: 88 BPM | OXYGEN SATURATION: 97 % | TEMPERATURE: 97 F

## 2018-12-20 LAB
ALBUMIN SERPL ELPH-MCNC: 3.4 G/DL — SIGNIFICANT CHANGE UP (ref 3.3–5)
ALP SERPL-CCNC: 73 U/L — SIGNIFICANT CHANGE UP (ref 40–120)
ALT FLD-CCNC: 60 U/L — HIGH (ref 10–45)
ANION GAP SERPL CALC-SCNC: 11 MMOL/L — SIGNIFICANT CHANGE UP (ref 5–17)
AST SERPL-CCNC: 48 U/L — HIGH (ref 10–40)
BILIRUB SERPL-MCNC: 0.7 MG/DL — SIGNIFICANT CHANGE UP (ref 0.2–1.2)
BUN SERPL-MCNC: 15 MG/DL — SIGNIFICANT CHANGE UP (ref 7–23)
CALCIUM SERPL-MCNC: 8.6 MG/DL — SIGNIFICANT CHANGE UP (ref 8.4–10.5)
CHLORIDE SERPL-SCNC: 99 MMOL/L — SIGNIFICANT CHANGE UP (ref 96–108)
CO2 SERPL-SCNC: 25 MMOL/L — SIGNIFICANT CHANGE UP (ref 22–31)
CREAT SERPL-MCNC: 0.81 MG/DL — SIGNIFICANT CHANGE UP (ref 0.5–1.3)
GLUCOSE SERPL-MCNC: 114 MG/DL — HIGH (ref 70–99)
HCT VFR BLD CALC: 29.4 % — LOW (ref 39–50)
HGB BLD-MCNC: 10.4 G/DL — LOW (ref 13–17)
MCHC RBC-ENTMCNC: 33.3 PG — SIGNIFICANT CHANGE UP (ref 27–34)
MCHC RBC-ENTMCNC: 35.6 GM/DL — SIGNIFICANT CHANGE UP (ref 32–36)
MCV RBC AUTO: 93.8 FL — SIGNIFICANT CHANGE UP (ref 80–100)
PLATELET # BLD AUTO: 218 K/UL — SIGNIFICANT CHANGE UP (ref 150–400)
POTASSIUM SERPL-MCNC: 4.2 MMOL/L — SIGNIFICANT CHANGE UP (ref 3.5–5.3)
POTASSIUM SERPL-SCNC: 4.2 MMOL/L — SIGNIFICANT CHANGE UP (ref 3.5–5.3)
PROT SERPL-MCNC: 6.5 G/DL — SIGNIFICANT CHANGE UP (ref 6–8.3)
RBC # BLD: 3.13 M/UL — LOW (ref 4.2–5.8)
RBC # FLD: 12.8 % — SIGNIFICANT CHANGE UP (ref 10.3–14.5)
SODIUM SERPL-SCNC: 135 MMOL/L — SIGNIFICANT CHANGE UP (ref 135–145)
WBC # BLD: 9.6 K/UL — SIGNIFICANT CHANGE UP (ref 3.8–10.5)
WBC # FLD AUTO: 9.6 K/UL — SIGNIFICANT CHANGE UP (ref 3.8–10.5)

## 2018-12-20 PROCEDURE — P9047: CPT

## 2018-12-20 PROCEDURE — 71045 X-RAY EXAM CHEST 1 VIEW: CPT

## 2018-12-20 PROCEDURE — 88304 TISSUE EXAM BY PATHOLOGIST: CPT

## 2018-12-20 PROCEDURE — 86022 PLATELET ANTIBODIES: CPT

## 2018-12-20 PROCEDURE — 84436 ASSAY OF TOTAL THYROXINE: CPT

## 2018-12-20 PROCEDURE — 82550 ASSAY OF CK (CPK): CPT

## 2018-12-20 PROCEDURE — 84132 ASSAY OF SERUM POTASSIUM: CPT

## 2018-12-20 PROCEDURE — 82330 ASSAY OF CALCIUM: CPT

## 2018-12-20 PROCEDURE — C1768: CPT

## 2018-12-20 PROCEDURE — 88305 TISSUE EXAM BY PATHOLOGIST: CPT

## 2018-12-20 PROCEDURE — 86923 COMPATIBILITY TEST ELECTRIC: CPT

## 2018-12-20 PROCEDURE — 84480 ASSAY TRIIODOTHYRONINE (T3): CPT

## 2018-12-20 PROCEDURE — 97161 PT EVAL LOW COMPLEX 20 MIN: CPT

## 2018-12-20 PROCEDURE — 80048 BASIC METABOLIC PNL TOTAL CA: CPT

## 2018-12-20 PROCEDURE — 86891 AUTOLOGOUS BLOOD OP SALVAGE: CPT

## 2018-12-20 PROCEDURE — 80053 COMPREHEN METABOLIC PANEL: CPT

## 2018-12-20 PROCEDURE — 82962 GLUCOSE BLOOD TEST: CPT

## 2018-12-20 PROCEDURE — 83605 ASSAY OF LACTIC ACID: CPT

## 2018-12-20 PROCEDURE — 85027 COMPLETE CBC AUTOMATED: CPT

## 2018-12-20 PROCEDURE — 97116 GAIT TRAINING THERAPY: CPT

## 2018-12-20 PROCEDURE — 82435 ASSAY OF BLOOD CHLORIDE: CPT

## 2018-12-20 PROCEDURE — 82553 CREATINE MB FRACTION: CPT

## 2018-12-20 PROCEDURE — P9016: CPT

## 2018-12-20 PROCEDURE — 86901 BLOOD TYPING SEROLOGIC RH(D): CPT

## 2018-12-20 PROCEDURE — 93005 ELECTROCARDIOGRAM TRACING: CPT

## 2018-12-20 PROCEDURE — 84484 ASSAY OF TROPONIN QUANT: CPT

## 2018-12-20 PROCEDURE — 84295 ASSAY OF SERUM SODIUM: CPT

## 2018-12-20 PROCEDURE — C1751: CPT

## 2018-12-20 PROCEDURE — 84443 ASSAY THYROID STIM HORMONE: CPT

## 2018-12-20 PROCEDURE — C1769: CPT

## 2018-12-20 PROCEDURE — 82803 BLOOD GASES ANY COMBINATION: CPT

## 2018-12-20 PROCEDURE — C1889: CPT

## 2018-12-20 PROCEDURE — 82947 ASSAY GLUCOSE BLOOD QUANT: CPT

## 2018-12-20 PROCEDURE — 85014 HEMATOCRIT: CPT

## 2018-12-20 PROCEDURE — 85730 THROMBOPLASTIN TIME PARTIAL: CPT

## 2018-12-20 PROCEDURE — 85384 FIBRINOGEN ACTIVITY: CPT

## 2018-12-20 PROCEDURE — 94002 VENT MGMT INPAT INIT DAY: CPT

## 2018-12-20 PROCEDURE — 71045 X-RAY EXAM CHEST 1 VIEW: CPT | Mod: 26

## 2018-12-20 PROCEDURE — 85610 PROTHROMBIN TIME: CPT

## 2018-12-20 PROCEDURE — P9045: CPT

## 2018-12-20 PROCEDURE — 88311 DECALCIFY TISSUE: CPT

## 2018-12-20 PROCEDURE — 86900 BLOOD TYPING SEROLOGIC ABO: CPT

## 2018-12-20 RX ORDER — ASPIRIN/CALCIUM CARB/MAGNESIUM 324 MG
1 TABLET ORAL
Qty: 30 | Refills: 0
Start: 2018-12-20 | End: 2019-01-18

## 2018-12-20 RX ORDER — OXYCODONE AND ACETAMINOPHEN 5; 325 MG/1; MG/1
1 TABLET ORAL
Qty: 28 | Refills: 0
Start: 2018-12-20 | End: 2018-12-26

## 2018-12-20 RX ORDER — METOPROLOL TARTRATE 50 MG
1 TABLET ORAL
Qty: 60 | Refills: 0
Start: 2018-12-20 | End: 2019-01-18

## 2018-12-20 RX ORDER — DOCUSATE SODIUM 100 MG
1 CAPSULE ORAL
Qty: 90 | Refills: 0
Start: 2018-12-20 | End: 2019-01-18

## 2018-12-20 RX ORDER — FAMOTIDINE 10 MG/ML
1 INJECTION INTRAVENOUS
Qty: 60 | Refills: 0
Start: 2018-12-20 | End: 2019-01-18

## 2018-12-20 RX ORDER — SPIRONOLACTONE 25 MG/1
1 TABLET, FILM COATED ORAL
Qty: 5 | Refills: 0
Start: 2018-12-20 | End: 2018-12-24

## 2018-12-20 RX ORDER — POLYETHYLENE GLYCOL 3350 17 G/17G
17 POWDER, FOR SOLUTION ORAL
Qty: 17 | Refills: 0
Start: 2018-12-20 | End: 2019-01-18

## 2018-12-20 RX ORDER — ACETAMINOPHEN 500 MG
2 TABLET ORAL
Qty: 240 | Refills: 0
Start: 2018-12-20 | End: 2019-01-18

## 2018-12-20 RX ORDER — LISINOPRIL 2.5 MG/1
1 TABLET ORAL
Qty: 0 | Refills: 0 | COMMUNITY

## 2018-12-20 RX ORDER — COLCHICINE 0.6 MG
1 TABLET ORAL
Qty: 60 | Refills: 0
Start: 2018-12-20 | End: 2019-01-18

## 2018-12-20 RX ORDER — FUROSEMIDE 40 MG
1 TABLET ORAL
Qty: 5 | Refills: 0
Start: 2018-12-20 | End: 2018-12-24

## 2018-12-20 RX ORDER — ATORVASTATIN CALCIUM 80 MG/1
1 TABLET, FILM COATED ORAL
Qty: 30 | Refills: 0
Start: 2018-12-20 | End: 2019-01-18

## 2018-12-20 RX ADMIN — CHLORHEXIDINE GLUCONATE 5 MILLILITER(S): 213 SOLUTION TOPICAL at 06:20

## 2018-12-20 RX ADMIN — HYDROMORPHONE HYDROCHLORIDE 4 MILLIGRAM(S): 2 INJECTION INTRAMUSCULAR; INTRAVENOUS; SUBCUTANEOUS at 07:00

## 2018-12-20 RX ADMIN — Medication 81 MILLIGRAM(S): at 11:27

## 2018-12-20 RX ADMIN — Medication 0.6 MILLIGRAM(S): at 06:19

## 2018-12-20 RX ADMIN — CHLORHEXIDINE GLUCONATE 5 MILLILITER(S): 213 SOLUTION TOPICAL at 06:18

## 2018-12-20 RX ADMIN — HYDROMORPHONE HYDROCHLORIDE 4 MILLIGRAM(S): 2 INJECTION INTRAMUSCULAR; INTRAVENOUS; SUBCUTANEOUS at 06:20

## 2018-12-20 RX ADMIN — Medication 50 MILLIGRAM(S): at 06:25

## 2018-12-20 RX ADMIN — ENOXAPARIN SODIUM 40 MILLIGRAM(S): 100 INJECTION SUBCUTANEOUS at 11:27

## 2018-12-20 RX ADMIN — SPIRONOLACTONE 25 MILLIGRAM(S): 25 TABLET, FILM COATED ORAL at 06:20

## 2018-12-20 RX ADMIN — Medication 40 MILLIGRAM(S): at 06:20

## 2018-12-20 RX ADMIN — FAMOTIDINE 20 MILLIGRAM(S): 10 INJECTION INTRAVENOUS at 06:25

## 2018-12-20 RX ADMIN — Medication 100 MILLIGRAM(S): at 06:25

## 2018-12-20 NOTE — DISCHARGE NOTE ADULT - CARE PLAN
Principal Discharge DX:	S/P aortic aneurysm repair  Goal:	complete recovery from surgery  Assessment and plan of treatment:	Follow Do's and Don't of cardiac surgery handout/info packet.   Continue to take your prescribed meds as ordered and instructed.   Follow up w/ Dr. Mata's NP Donaldo at [facility] Cardiac surgery clinic on WEDnesday 12/26/18 at 10 AM. Call [phone] to confirm your appointment.  Follow up w/ Dr. Mata at [facility] Cardiac surgery clinic on FRIday 1/11/19 at 0930. Call [phone] to confirm your appointment.  Follow up w/ your primary care doctor Dr. Ren in 1-2 weeks. Call [phone] to schedule an appointment.   Follow up with your cadiologist Dr. Sepulveda in 1-2 weeks call [phone] to schedule your apointment.

## 2018-12-20 NOTE — DISCHARGE NOTE ADULT - ADDITIONAL INSTRUCTIONS
**Please make sure to follow up on these 2 appointments**  Follow up w/ Dr. Mata's NP Donaldo at [facility] Cardiac surgery clinic on WEDnesday 12/26/18 at 10 AM. Call [phone] to confirm your appointment.  Follow up w/ Dr. Mata at [facility] Cardiac surgery clinic on FRIday 1/11/19 at 0930. Call [phone] to confirm your appointment.  Follow up w/ your primary care doctor Dr. Ren in 1-2 weeks. Call [phone] to schedule an appointment.   Follow up with your cadiologist Dr. Sepulveda in 1-2 weeks call [phone] to schedule your apointment.

## 2018-12-20 NOTE — DISCHARGE NOTE ADULT - CARE PROVIDER_API CALL
Jey Mata (MD), Surgery; Thoracic and Cardiac Surgery  [address]  [address] [place] [address]  Phone: [phone]  Fax: [phone]    OFFICE, CARDIAC SURGERY  [facility] FLOOT  Phone: [phone]  Fax: (   )    -

## 2018-12-20 NOTE — DISCHARGE NOTE ADULT - NS AS ACTIVITY OBS
Sex allowed/Walking-Outdoors allowed/Showering allowed/No Heavy lifting/straining/Walking-Indoors allowed/Do not make important decisions/Stairs allowed

## 2018-12-20 NOTE — DISCHARGE NOTE ADULT - PLAN OF CARE
complete recovery from surgery Follow Do's and Don't of cardiac surgery handout/info packet.   Continue to take your prescribed meds as ordered and instructed.   Follow up w/ Dr. Mata's NP Donaldo at [facility] Cardiac surgery clinic on WEDnesday 12/26/18 at 10 AM. Call [phone] to confirm your appointment.  Follow up w/ Dr. Mata at [facility] Cardiac surgery clinic on FRIday 1/11/19 at 0930. Call [phone] to confirm your appointment.  Follow up w/ your primary care doctor Dr. Ren in 1-2 weeks. Call [phone] to schedule an appointment.   Follow up with your cadiologist Dr. Sepulveda in 1-2 weeks call [phone] to schedule your apointment.

## 2018-12-20 NOTE — DISCHARGE NOTE ADULT - HOSPITAL COURSE
74 yo male, PMH HTN, with known AAA for about 2 years, reports PISANO that he first noticed last winter when shoveling snow and has become worse, recent echocardiogram on 11/15/18 revealed an increase in size on AAA to 5.25 and calcified aortic valve. Pt. presents to PST today for scheduled Hemiarch aortic Valve Replacement/  On 12/13 s/p hemiarch replaced of ascending aorta/AVR -T  Post-op Course:   HTN requiring Cardene gtt --> Weaned off on lopressor 25 mg PO TID    diurese/BB/asa/ Statin  Transferred to SDU POD #1- maintain CT's  12/15 VVS; Maintain MS CT x 2 2/2 drainage.  Toradol 30 mg IV x 1. Plts 55,000 send HIT.   12/16/18 - VSS plt count up to 62,000 HIT pending- will d/c 1 CT - last CT still with hi output @ this time.  will probably d/c tomorrow  d/c plan home tue/wed 12/17 CT drainage remains up, keep in place  May transdfer to floor  Pain management  12/18 ct 250cc 24hr, lasix/aldactone/colchicine started, ct remains in.  Lopressor increased for bp/hr control  12/19 VSS, Mediastinal CT remains, 170 ml per 24 hours, will d/c later if output <60 ml.  today.   12/20 VSS, +BM today. wounds stable. For discharge to home today.

## 2018-12-20 NOTE — DISCHARGE NOTE ADULT - PROVIDER TOKENS
TOKEN:'[id number]',FREE:[LAST:[OFFICE],FIRST:[CARDIAC SURGERY],PHONE:[phone]],FAX:[(   )    -],ADDRESS[address]

## 2018-12-20 NOTE — DISCHARGE NOTE ADULT - PATIENT PORTAL LINK FT
You can access the [facility] Patient Portal, offered by [facility], by registering with the following website: [email or web address]

## 2018-12-20 NOTE — DISCHARGE NOTE ADULT - HOME CARE AGENCY
[facility] [phone] for visiting nurse. Agency will contact you to schedule appt.

## 2018-12-24 PROBLEM — I10 HYPERTENSION: Status: ACTIVE | Noted: 2018-11-20

## 2018-12-24 RX ORDER — ATORVASTATIN CALCIUM 40 MG/1
40 TABLET, FILM COATED ORAL
Qty: 30 | Refills: 1 | Status: ACTIVE | COMMUNITY
Start: 2018-12-24

## 2018-12-24 RX ORDER — METOPROLOL TARTRATE 50 MG/1
50 TABLET, FILM COATED ORAL
Qty: 180 | Refills: 0 | Status: ACTIVE | COMMUNITY
Start: 2018-12-24

## 2018-12-24 RX ORDER — ASPIRIN ENTERIC COATED TABLETS 81 MG 81 MG/1
81 TABLET, DELAYED RELEASE ORAL
Qty: 90 | Refills: 1 | Status: ACTIVE | COMMUNITY
Start: 2018-12-24

## 2018-12-24 RX ORDER — FAMOTIDINE 20 MG/1
20 TABLET, FILM COATED ORAL
Qty: 90 | Refills: 1 | Status: ACTIVE | COMMUNITY
Start: 2018-12-24

## 2018-12-24 RX ORDER — LISINOPRIL 20 MG/1
20 TABLET ORAL DAILY
Refills: 0 | Status: COMPLETED | COMMUNITY
End: 2018-12-24

## 2018-12-27 ENCOUNTER — APPOINTMENT (OUTPATIENT)
Dept: CARDIOTHORACIC SURGERY | Facility: CLINIC | Age: 73
End: 2018-12-27
Payer: MEDICARE

## 2018-12-27 VITALS — SYSTOLIC BLOOD PRESSURE: 115 MMHG | DIASTOLIC BLOOD PRESSURE: 73 MMHG

## 2018-12-27 VITALS
SYSTOLIC BLOOD PRESSURE: 146 MMHG | HEART RATE: 80 BPM | OXYGEN SATURATION: 95 % | WEIGHT: 227 LBS | DIASTOLIC BLOOD PRESSURE: 85 MMHG | HEIGHT: 68 IN | RESPIRATION RATE: 14 BRPM | TEMPERATURE: 98.4 F | BODY MASS INDEX: 34.4 KG/M2

## 2018-12-27 DIAGNOSIS — I10 ESSENTIAL (PRIMARY) HYPERTENSION: ICD-10-CM

## 2018-12-27 PROCEDURE — 99024 POSTOP FOLLOW-UP VISIT: CPT

## 2018-12-27 RX ORDER — LORATADINE 10 MG
17 TABLET,DISINTEGRATING ORAL DAILY
Qty: 1 | Refills: 1 | Status: COMPLETED | COMMUNITY
Start: 2018-12-24 | End: 2018-12-27

## 2018-12-27 RX ORDER — PANTOPRAZOLE 40 MG/1
40 TABLET, DELAYED RELEASE ORAL DAILY
Qty: 30 | Refills: 0 | Status: COMPLETED | COMMUNITY
Start: 2018-12-24 | End: 2018-12-27

## 2018-12-27 RX ORDER — FUROSEMIDE 40 MG/1
40 TABLET ORAL DAILY
Qty: 7 | Refills: 0 | Status: COMPLETED | COMMUNITY
Start: 2018-12-24 | End: 2018-12-27

## 2018-12-27 RX ORDER — SPIRONOLACTONE 25 MG/1
25 TABLET ORAL DAILY
Qty: 7 | Refills: 0 | Status: COMPLETED | COMMUNITY
Start: 2018-12-24 | End: 2018-12-27

## 2018-12-27 RX ORDER — COLCHICINE 0.6 MG/1
0.6 TABLET ORAL TWICE DAILY
Qty: 60 | Refills: 0 | Status: COMPLETED | COMMUNITY
Start: 2018-12-24 | End: 2018-12-27

## 2019-01-11 ENCOUNTER — APPOINTMENT (OUTPATIENT)
Dept: CARDIOTHORACIC SURGERY | Facility: CLINIC | Age: 74
End: 2019-01-11
Payer: MEDICARE

## 2019-01-11 VITALS — HEART RATE: 86 BPM | TEMPERATURE: 97.8 F | RESPIRATION RATE: 13 BRPM | OXYGEN SATURATION: 98 %

## 2019-01-11 VITALS — HEIGHT: 68 IN | WEIGHT: 225.6 LBS | BODY MASS INDEX: 34.19 KG/M2

## 2019-01-11 VITALS — DIASTOLIC BLOOD PRESSURE: 70 MMHG | SYSTOLIC BLOOD PRESSURE: 154 MMHG

## 2019-01-11 VITALS — SYSTOLIC BLOOD PRESSURE: 135 MMHG | DIASTOLIC BLOOD PRESSURE: 77 MMHG

## 2019-01-11 DIAGNOSIS — Z95.2 PRESENCE OF PROSTHETIC HEART VALVE: ICD-10-CM

## 2019-01-11 DIAGNOSIS — Z09 ENCOUNTER FOR FOLLOW-UP EXAMINATION AFTER COMPLETED TREATMENT FOR CONDITIONS OTHER THAN MALIGNANT NEOPLASM: ICD-10-CM

## 2019-01-11 DIAGNOSIS — Z95.828 PRESENCE OF OTHER VASCULAR IMPLANTS AND GRAFTS: ICD-10-CM

## 2019-01-11 PROCEDURE — 99024 POSTOP FOLLOW-UP VISIT: CPT

## 2021-07-16 NOTE — PHYSICAL THERAPY INITIAL EVALUATION ADULT - GENERAL OBSERVATIONS, REHAB EVAL
Received pt seated in chair in CTU. +IJ, radial a-line, heart monitor, chest tube, O2 NC, cont pulse ox, temp probe. good minus

## 2022-05-18 ENCOUNTER — NON-APPOINTMENT (OUTPATIENT)
Age: 77
End: 2022-05-18

## 2023-05-02 ENCOUNTER — NON-APPOINTMENT (OUTPATIENT)
Age: 78
End: 2023-05-02

## 2024-05-08 ENCOUNTER — TRANSCRIPTION ENCOUNTER (OUTPATIENT)
Age: 79
End: 2024-05-08

## 2024-05-09 ENCOUNTER — OUTPATIENT (OUTPATIENT)
Dept: OUTPATIENT SERVICES | Facility: HOSPITAL | Age: 79
LOS: 1 days | End: 2024-05-09
Payer: MEDICARE

## 2024-05-09 VITALS
HEART RATE: 75 BPM | HEIGHT: 67.25 IN | WEIGHT: 224.43 LBS | OXYGEN SATURATION: 97 % | TEMPERATURE: 98 F | SYSTOLIC BLOOD PRESSURE: 166 MMHG | RESPIRATION RATE: 15 BRPM | DIASTOLIC BLOOD PRESSURE: 72 MMHG

## 2024-05-09 VITALS
RESPIRATION RATE: 15 BRPM | OXYGEN SATURATION: 97 % | DIASTOLIC BLOOD PRESSURE: 71 MMHG | SYSTOLIC BLOOD PRESSURE: 148 MMHG | HEART RATE: 78 BPM

## 2024-05-09 DIAGNOSIS — Z98.890 OTHER SPECIFIED POSTPROCEDURAL STATES: Chronic | ICD-10-CM

## 2024-05-09 DIAGNOSIS — Z98.49 CATARACT EXTRACTION STATUS, UNSPECIFIED EYE: Chronic | ICD-10-CM

## 2024-05-09 DIAGNOSIS — T85.22XA DISPLACEMENT OF INTRAOCULAR LENS, INITIAL ENCOUNTER: ICD-10-CM

## 2024-05-09 DIAGNOSIS — Z95.2 PRESENCE OF PROSTHETIC HEART VALVE: Chronic | ICD-10-CM

## 2024-05-09 PROCEDURE — V2632: CPT

## 2024-05-09 PROCEDURE — 67036 REMOVAL OF INNER EYE FLUID: CPT | Mod: RT

## 2024-05-09 PROCEDURE — 66986 EXCHANGE LENS PROSTHESIS: CPT | Mod: RT

## 2024-05-09 DEVICE — LASER PROBE 25G CONSTELLATION: Type: IMPLANTABLE DEVICE | Site: RIGHT | Status: FUNCTIONAL

## 2024-05-09 DEVICE — IMPLANTABLE DEVICE: Type: IMPLANTABLE DEVICE | Site: RIGHT | Status: FUNCTIONAL

## 2024-05-09 RX ORDER — HYDROCHLOROTHIAZIDE 25 MG
1 TABLET ORAL
Refills: 0 | DISCHARGE

## 2024-05-09 RX ORDER — CHOLECALCIFEROL (VITAMIN D3) 125 MCG
1 CAPSULE ORAL
Qty: 0 | Refills: 0 | DISCHARGE

## 2024-05-09 NOTE — ASU PATIENT PROFILE, ADULT - VISION (WITH CORRECTIVE LENSES IF THE PATIENT USUALLY WEARS THEM):
Right eye poor vision/Partially impaired: cannot see medication labels or newsprint, but can see obstacles in path, and the surrounding layout; can count fingers at arm's length

## 2024-05-09 NOTE — ASU PATIENT PROFILE, ADULT - NSICDXPASTSURGICALHX_GEN_ALL_CORE_FT
PAST SURGICAL HISTORY:  Aortic valve replaced     H/O foot surgery bilateral "ball of foot"    S/P cataract surgery bilateral

## 2024-05-09 NOTE — ASU PATIENT PROFILE, ADULT - FALL HARM RISK - HARM RISK INTERVENTIONS

## 2024-05-09 NOTE — ASU DISCHARGE PLAN (ADULT/PEDIATRIC) - CARE PROVIDER_API CALL
Kaushal Galloway  Ophthalmology  [address] [place] [address], [address]  [place] [address]  Phone: [phone]  Fax: [phone]  Scheduled Appointment: 05/10/2024 10:00 AM

## 2024-05-09 NOTE — ASU DISCHARGE PLAN (ADULT/PEDIATRIC) - ASU DC SPECIAL INSTRUCTIONSFT
Continue Home Medication Regimen as per your medical provider, any discrepancies or questions contact your primary medical provider  Keep patch and shield on until seen in the office tomorrow  Follow up in the Retina Surgeon's office tomorrow

## 2024-05-09 NOTE — ASU PATIENT PROFILE, ADULT - NSICDXPASTMEDICALHX_GEN_ALL_CORE_FT
PAST MEDICAL HISTORY:  Aortic arch aneurysm     Aortic valve disease     Ascending aortic aneurysm     Hemorrhoids     HTN (hypertension)     OA (osteoarthritis) of knee left

## 2024-05-09 NOTE — ASU DISCHARGE PLAN (ADULT/PEDIATRIC) - PROCEDURE
Vitrectomy, Removal Displaced Intraocular Lens with Placement of Suture Intraocular Lens, Endolaser: Right Eye

## 2024-05-09 NOTE — ASU PATIENT PROFILE, ADULT - CAREGIVER PHONE NUMBER
indicated. MANUAL THERAPY: (15 minutes): Soft tissue mobilization was utilized and necessary because of the patient's restricted joint motion and restricted motion of soft tissue. No grids  Reviewed wand flexion, wand external rotation, sword of hope and chicken wing x 10 reps. She will perform these 1-2 x/day. We reviewed bicep curls, wall push ups, bent over rows, bent over triceps extension, shoulder flexion to 90 and shoulder abduction to 90 with a 2# x 10 reps. She will perform these 3x/week for 10 reps. Tissue of the breast and axilla is soft and pliable. Minimal tenderness noted. She will pursue a new garment with a glove. She was educated in the progression and treatment of lymphedema. At this point a day garment is sufficient. If she continues to have heaviness or puffiness we will pursue a night garment. She will return after she receives her new garment. Treatment/Session Summary:    Treatment Assessment:  tissue is soft and pliable. girht is stable. she is in need of a glove. Communication/Consultation:   HEP  Equipment provided today:  None  Recommendations/Intent for next treatment session: Next visit will focus on ROM, strength and soft tissue mobility. She will return in 2 weeks unless heaviness increases.     Total Treatment Billable Duration:  30 minutes  Time In: 5359  Time Out: 0805    BLAINE GUTIERREZ, PT       Charge Capture  }Post Session Pain  PT Visit Info  [employer] Portal  MD Guidelines  Scanned Media  Benefits  MyChart    Future Appointments   Date Time Provider Eddie Trotter   4/3/2023 11:20 AM DO BALBIR Rojas L [facility]B   4/24/2023  2:30 PM Basilio Pepe [facility]   4/24/2023  3:00 PM LYSSA Negro - [facility]   4/24/2023  3:30 PM  [facility] [phone]

## (undated) DEVICE — ELCTR ERASER BI-P BVL 45DEG 18G

## (undated) DEVICE — SUT NYLON 10-0 12" CU-5

## (undated) DEVICE — LIGHT SHIELD CORNEAL

## (undated) DEVICE — CANNULA ALCON SOFT TIP 25G

## (undated) DEVICE — SUT VICRYL 10-0 12" CS160-6 DA

## (undated) DEVICE — ELCTR BIPOLAR CORD J&J 12FT DISP

## (undated) DEVICE — KNFE VITREC 20G

## (undated) DEVICE — FORCEP GRIESHABER SERRATED 25G DISP

## (undated) DEVICE — CONSTELLATION TOTAL PLUS PAK 23G

## (undated) DEVICE — KNFE OPTH SLIT ANG 3MM

## (undated) DEVICE — SYSTEM ENTRY OPHTHALMIC VALVED 23G

## (undated) DEVICE — VENODYNE/SCD SLEEVE CALF MEDIUM

## (undated) DEVICE — CANNULA ALCON PROVISC 27G THREADED HUB

## (undated) DEVICE — SUT GORETEX CV-8 (7-0) 30" PT-9

## (undated) DEVICE — SUT VICRYL 8-0 12" TG140-8 DA

## (undated) DEVICE — DRAPE OPHTHALMIC W POUCH

## (undated) DEVICE — SOL BALANCE SALT 15ML

## (undated) DEVICE — SYSTEM ENTRY OPHTHALMIC VALVED 25G

## (undated) DEVICE — SOL IRR BAL SALT + 500ML

## (undated) DEVICE — SUT VICRYL 10-0 12" CS160-8 DA

## (undated) DEVICE — WARMING BLANKET LOWER ADULT

## (undated) DEVICE — ELCTR BVI ACCU-TEMP CAUTERY SHAFT FINE TIP 1/2"

## (undated) DEVICE — SINSKEY IOL LENS MANIPULATOR STRAIGHT DISP

## (undated) DEVICE — NDL HYPO SAFE 22G X 1.5" (BLACK)

## (undated) DEVICE — KNIFE OPTH SHARPOINT CRESCENT 2MM

## (undated) DEVICE — PACK VITRECTOMY

## (undated) DEVICE — PACK CONSTELLATION POST 25G 20K

## (undated) DEVICE — GLV 7.5 PROTEXIS (WHITE)